# Patient Record
Sex: MALE | Race: WHITE
[De-identification: names, ages, dates, MRNs, and addresses within clinical notes are randomized per-mention and may not be internally consistent; named-entity substitution may affect disease eponyms.]

---

## 2017-11-01 ENCOUNTER — HOSPITAL ENCOUNTER (OUTPATIENT)
Dept: HOSPITAL 17 - HROP | Age: 82
Discharge: HOME | End: 2017-11-01
Attending: INTERNAL MEDICINE
Payer: MEDICARE

## 2017-11-01 VITALS
OXYGEN SATURATION: 97 % | SYSTOLIC BLOOD PRESSURE: 120 MMHG | DIASTOLIC BLOOD PRESSURE: 57 MMHG | HEART RATE: 69 BPM | RESPIRATION RATE: 20 BRPM

## 2017-11-01 VITALS
HEART RATE: 74 BPM | SYSTOLIC BLOOD PRESSURE: 129 MMHG | TEMPERATURE: 97.5 F | OXYGEN SATURATION: 90 % | DIASTOLIC BLOOD PRESSURE: 68 MMHG | RESPIRATION RATE: 20 BRPM

## 2017-11-01 VITALS — BODY MASS INDEX: 21.05 KG/M2 | WEIGHT: 150.36 LBS | HEIGHT: 71 IN

## 2017-11-01 VITALS
TEMPERATURE: 97.4 F | HEART RATE: 64 BPM | SYSTOLIC BLOOD PRESSURE: 125 MMHG | DIASTOLIC BLOOD PRESSURE: 67 MMHG | OXYGEN SATURATION: 98 % | RESPIRATION RATE: 20 BRPM

## 2017-11-01 VITALS
SYSTOLIC BLOOD PRESSURE: 120 MMHG | RESPIRATION RATE: 20 BRPM | HEART RATE: 65 BPM | DIASTOLIC BLOOD PRESSURE: 63 MMHG | OXYGEN SATURATION: 98 %

## 2017-11-01 DIAGNOSIS — F10.10: ICD-10-CM

## 2017-11-01 DIAGNOSIS — Z85.46: ICD-10-CM

## 2017-11-01 DIAGNOSIS — I10: ICD-10-CM

## 2017-11-01 DIAGNOSIS — R07.9: ICD-10-CM

## 2017-11-01 DIAGNOSIS — K21.9: ICD-10-CM

## 2017-11-01 DIAGNOSIS — Z01.818: ICD-10-CM

## 2017-11-01 DIAGNOSIS — Z45.2: Primary | ICD-10-CM

## 2017-11-01 DIAGNOSIS — E78.00: ICD-10-CM

## 2017-11-01 DIAGNOSIS — C15.4: ICD-10-CM

## 2017-11-01 DIAGNOSIS — F17.200: ICD-10-CM

## 2017-11-01 LAB
INR PPP: 1 RATIO
PROTHROMBIN TIME: 11.4 SEC (ref 9.8–11.6)

## 2017-11-01 PROCEDURE — 85610 PROTHROMBIN TIME: CPT

## 2017-11-01 PROCEDURE — 36590 REMOVAL TUNNELED CV CATH: CPT

## 2017-11-01 PROCEDURE — 85730 THROMBOPLASTIN TIME PARTIAL: CPT

## 2017-11-01 NOTE — PD.RAD
Post Procedure Progress Note


Pre Procedure Diagnosis:  


(1) Esophageal adenocarcinoma


Post Procedure Diagnosis:  


(1) Esophageal adenocarcinoma


Procedure Date:


Nov 1, 2017


Supervising Radiologist:


Franklin Hunter


Proceduralist/Assist:  Loree Romero, RT(R)(CV), RT More(R)


Anesthesia:  Local


Plan of Activity


Patient to Unit:  ROP[facility]


Patient Condition:  Good


See PACS Report for procedural detail/treatment





Central Venous Access Device


Procedure 1


Right


Internal Jugular


Infusaport


Removal


single lumen











rFanklin Hunter MD Nov 1, 2017 10:51

## 2017-11-01 NOTE — PD.RAD
Post Procedure Progress Note


Pre Procedure Diagnosis:  


(1) Esophageal adenocarcinoma


Post Procedure Diagnosis:  


(1) Esophageal adenocarcinoma


Procedure Date:


Nov 1, 2017


Supervising Radiologist:


Franklin Hunter


Proceduralist/Assist:  Loree Romero, RT(R)(CV), RT More(R)


Anesthesia:  Local


Plan of Activity


Patient to Unit:  ROP[facility]


Patient Condition:  Good


See PACS Report for procedural detail/treatment





Central Venous Access Device


Procedure 1


Right


Internal Jugular


Infusaport


Removal


single lumen











Franklin Hunter MD Nov 1, 2017 10:51

## 2018-04-11 ENCOUNTER — HOSPITAL ENCOUNTER (INPATIENT)
Dept: HOSPITAL 17 - NEPC | Age: 83
LOS: 11 days | Discharge: HOME HEALTH | DRG: 356 | End: 2018-04-22
Attending: FAMILY MEDICINE | Admitting: FAMILY MEDICINE
Payer: MEDICARE

## 2018-04-11 VITALS
DIASTOLIC BLOOD PRESSURE: 65 MMHG | SYSTOLIC BLOOD PRESSURE: 144 MMHG | HEART RATE: 57 BPM | OXYGEN SATURATION: 96 % | RESPIRATION RATE: 19 BRPM

## 2018-04-11 VITALS
SYSTOLIC BLOOD PRESSURE: 145 MMHG | HEART RATE: 54 BPM | RESPIRATION RATE: 20 BRPM | DIASTOLIC BLOOD PRESSURE: 65 MMHG | OXYGEN SATURATION: 96 %

## 2018-04-11 VITALS
HEART RATE: 72 BPM | RESPIRATION RATE: 17 BRPM | SYSTOLIC BLOOD PRESSURE: 123 MMHG | TEMPERATURE: 97.2 F | DIASTOLIC BLOOD PRESSURE: 60 MMHG | OXYGEN SATURATION: 100 %

## 2018-04-11 VITALS — RESPIRATION RATE: 16 BRPM | OXYGEN SATURATION: 97 %

## 2018-04-11 VITALS
RESPIRATION RATE: 17 BRPM | DIASTOLIC BLOOD PRESSURE: 68 MMHG | OXYGEN SATURATION: 98 % | HEART RATE: 57 BPM | SYSTOLIC BLOOD PRESSURE: 155 MMHG | TEMPERATURE: 97.6 F

## 2018-04-11 VITALS — HEIGHT: 71 IN | BODY MASS INDEX: 18.18 KG/M2 | WEIGHT: 129.85 LBS

## 2018-04-11 VITALS
HEART RATE: 60 BPM | OXYGEN SATURATION: 97 % | RESPIRATION RATE: 19 BRPM | DIASTOLIC BLOOD PRESSURE: 60 MMHG | SYSTOLIC BLOOD PRESSURE: 124 MMHG

## 2018-04-11 VITALS
DIASTOLIC BLOOD PRESSURE: 69 MMHG | HEART RATE: 53 BPM | RESPIRATION RATE: 16 BRPM | TEMPERATURE: 97.3 F | SYSTOLIC BLOOD PRESSURE: 145 MMHG | OXYGEN SATURATION: 99 %

## 2018-04-11 DIAGNOSIS — Z88.0: ICD-10-CM

## 2018-04-11 DIAGNOSIS — Z85.01: ICD-10-CM

## 2018-04-11 DIAGNOSIS — Z92.3: ICD-10-CM

## 2018-04-11 DIAGNOSIS — K22.6: ICD-10-CM

## 2018-04-11 DIAGNOSIS — Z85.46: ICD-10-CM

## 2018-04-11 DIAGNOSIS — F17.210: ICD-10-CM

## 2018-04-11 DIAGNOSIS — M19.90: ICD-10-CM

## 2018-04-11 DIAGNOSIS — E46: ICD-10-CM

## 2018-04-11 DIAGNOSIS — F10.20: ICD-10-CM

## 2018-04-11 DIAGNOSIS — E78.5: ICD-10-CM

## 2018-04-11 DIAGNOSIS — I10: ICD-10-CM

## 2018-04-11 DIAGNOSIS — Z87.19: ICD-10-CM

## 2018-04-11 DIAGNOSIS — Z92.21: ICD-10-CM

## 2018-04-11 DIAGNOSIS — R18.8: ICD-10-CM

## 2018-04-11 DIAGNOSIS — K21.9: ICD-10-CM

## 2018-04-11 DIAGNOSIS — K22.2: ICD-10-CM

## 2018-04-11 DIAGNOSIS — C78.89: Primary | ICD-10-CM

## 2018-04-11 DIAGNOSIS — K74.60: ICD-10-CM

## 2018-04-11 LAB
ALBUMIN SERPL-MCNC: 3.5 GM/DL (ref 3.4–5)
ALP SERPL-CCNC: 177 U/L (ref 45–117)
ALT SERPL-CCNC: 31 U/L (ref 12–78)
AST SERPL-CCNC: 33 U/L (ref 15–37)
BASOPHILS # BLD AUTO: 0 TH/MM3 (ref 0–0.2)
BASOPHILS NFR BLD: 0.3 % (ref 0–2)
BILIRUB SERPL-MCNC: 0.5 MG/DL (ref 0.2–1)
BUN SERPL-MCNC: 20 MG/DL (ref 7–18)
CALCIUM SERPL-MCNC: 9.1 MG/DL (ref 8.5–10.1)
CHLORIDE SERPL-SCNC: 100 MEQ/L (ref 98–107)
CREAT SERPL-MCNC: 1.08 MG/DL (ref 0.6–1.3)
EOSINOPHIL # BLD: 0.1 TH/MM3 (ref 0–0.4)
EOSINOPHIL NFR BLD: 1.4 % (ref 0–4)
ERYTHROCYTE [DISTWIDTH] IN BLOOD BY AUTOMATED COUNT: 13.2 % (ref 11.6–17.2)
GFR SERPLBLD BASED ON 1.73 SQ M-ARVRAT: 65 ML/MIN (ref 89–?)
GLUCOSE SERPL-MCNC: 142 MG/DL (ref 74–106)
HCO3 BLD-SCNC: 28.4 MEQ/L (ref 21–32)
HCT VFR BLD CALC: 39.9 % (ref 39–51)
HGB BLD-MCNC: 13.6 GM/DL (ref 13–17)
INR PPP: 1.1 RATIO
LYMPHOCYTES # BLD AUTO: 1.2 TH/MM3 (ref 1–4.8)
LYMPHOCYTES NFR BLD AUTO: 14.9 % (ref 9–44)
MCH RBC QN AUTO: 33 PG (ref 27–34)
MCHC RBC AUTO-ENTMCNC: 34 % (ref 32–36)
MCV RBC AUTO: 97.2 FL (ref 80–100)
MONOCYTE #: 0.5 TH/MM3 (ref 0–0.9)
MONOCYTES NFR BLD: 6.7 % (ref 0–8)
NEUTROPHILS # BLD AUTO: 6 TH/MM3 (ref 1.8–7.7)
NEUTROPHILS NFR BLD AUTO: 76.7 % (ref 16–70)
PLATELET # BLD: 199 TH/MM3 (ref 150–450)
PMV BLD AUTO: 7.8 FL (ref 7–11)
PROT SERPL-MCNC: 7.4 GM/DL (ref 6.4–8.2)
PROTHROMBIN TIME: 11.6 SEC (ref 9.8–11.6)
RBC # BLD AUTO: 4.11 MIL/MM3 (ref 4.5–5.9)
SODIUM SERPL-SCNC: 135 MEQ/L (ref 136–145)
WBC # BLD AUTO: 7.9 TH/MM3 (ref 4–11)

## 2018-04-11 PROCEDURE — 82378 CARCINOEMBRYONIC ANTIGEN: CPT

## 2018-04-11 PROCEDURE — 88342 IMHCHEM/IMCYTCHM 1ST ANTB: CPT

## 2018-04-11 PROCEDURE — 80048 BASIC METABOLIC PNL TOTAL CA: CPT

## 2018-04-11 PROCEDURE — 74018 RADEX ABDOMEN 1 VIEW: CPT

## 2018-04-11 PROCEDURE — 88112 CYTOPATH CELL ENHANCE TECH: CPT

## 2018-04-11 PROCEDURE — 93005 ELECTROCARDIOGRAM TRACING: CPT

## 2018-04-11 PROCEDURE — 71045 X-RAY EXAM CHEST 1 VIEW: CPT

## 2018-04-11 PROCEDURE — 85730 THROMBOPLASTIN TIME PARTIAL: CPT

## 2018-04-11 PROCEDURE — 88341 IMHCHEM/IMCYTCHM EA ADD ANTB: CPT

## 2018-04-11 PROCEDURE — 85025 COMPLETE CBC W/AUTO DIFF WBC: CPT

## 2018-04-11 PROCEDURE — 88305 TISSUE EXAM BY PATHOLOGIST: CPT

## 2018-04-11 PROCEDURE — 88307 TISSUE EXAM BY PATHOLOGIST: CPT

## 2018-04-11 PROCEDURE — 85610 PROTHROMBIN TIME: CPT

## 2018-04-11 PROCEDURE — 80053 COMPREHEN METABOLIC PANEL: CPT

## 2018-04-11 RX ADMIN — HEPARIN SODIUM SCH UNITS: 10000 INJECTION, SOLUTION INTRAVENOUS; SUBCUTANEOUS at 15:16

## 2018-04-11 RX ADMIN — STANDARDIZED SENNA CONCENTRATE AND DOCUSATE SODIUM SCH TAB: 8.6; 5 TABLET, FILM COATED ORAL at 20:55

## 2018-04-11 RX ADMIN — Medication SCH ML: at 20:56

## 2018-04-11 RX ADMIN — THIAMINE HYDROCHLORIDE SCH MLS/HR: 100 INJECTION, SOLUTION INTRAMUSCULAR; INTRAVENOUS at 15:16

## 2018-04-11 NOTE — PD.CONS
HPI


History of Present Illness


This is a 82 year old male with hx esophageal ca diagnosed 2016 s/p chemo and 

radiation who was referred by Dr Botello for endoscopic w/u after having recent 

PET scan suspicious for recurrent malignancy GE junction.  Pt had normal EGD 

2017.  Pt is c/o of dysphagia and weight loss.  For the last 2-3 weeks he has 

had difficulty swallowing solids and some liquids.  He regurgitates most solid 

food that he eats.  he is able to tolerate liquids and ensure but it takes him 

a long time to drink small amounts.  He has lost 20lbs in the last 3-4 weeks.  

He is scheduled for EUS with Dr Johnson 4/27/18.


 (Sonia Remy)





PFSH


Past Medical History


prostate ca


esophageal ca


Past Surgical History


ORIF ankle


prostate seeds


 (Sonia Remy)


Coded Allergies:  


     penicillin G (Unverified  Allergy, Mild, Rash, 4/11/18)


Family History


unk


Social History


drinks 1 beer daily but none in last couple weeks


smokes 1ppd


denies illicit drug use


 (Sonia Remy)





Review of Systems


Constitutional:  COMPLAINS OF: Weight loss


Endocrine:  DENIES: Polydipsia


Eyes:  DENIES: Blurred vision


Ears, nose, mouth, throat:  DENIES: Hearing loss


Respiratory:  DENIES: Cough


Cardiovascular:  DENIES: Chest pain


Gastrointestinal:  COMPLAINS OF: Vomiting, Difficulty Swallowing, DENIES: 

Abdominal pain, Black stools, Bloody stools, Nausea, Odynophagia, Hematemesis


Genitourinary:  DENIES: Hematuria


Musculoskeletal:  DENIES: Joint Swelling


Integumentary:  DENIES: Rash


Hematologic/lymphatic:  DENIES: Bruising


Immunologic/allergic:  DENIES: Eczema


Neurologic:  DENIES: Abnormal gait


Psychiatric:  DENIES: Confusion (Sonia Remy)





GI Exam


Vitals I&O





Vital Signs








  Date Time  Temp Pulse Resp B/P (MAP) Pulse Ox O2 Delivery O2 Flow Rate FiO2


 


4/11/18 15:00  54 20 145/65 (91) 96 Room Air  


 


4/11/18 13:00  57 19 144/65 (91) 96 Room Air  


 


4/11/18 11:00  60 19 124/60 (81) 97 Room Air  


 


4/11/18 08:33     97 Room Air  


 


4/11/18 08:33   16  99 Room Air  


 


4/11/18 08:07 97.2 72 17 123/60 (81) 100   








Imaging





Last Impressions








Chest X-Ray 4/11/18 0812 Signed





Impressions: 





 Service Date/Time:  Wednesday, April 11, 2018 08:35 - CONCLUSION:  New minimal 





 parenchymal changes right apex New from comparison study.     Diego Prince MD  FACR








Laboratory











Test


  4/11/18


08:35


 


White Blood Count 7.9 TH/MM3 


 


Red Blood Count 4.11 MIL/MM3 


 


Hemoglobin 13.6 GM/DL 


 


Hematocrit 39.9 % 


 


Mean Corpuscular Volume 97.2 FL 


 


Mean Corpuscular Hemoglobin 33.0 PG 


 


Mean Corpuscular Hemoglobin


Concent 34.0 % 


 


 


Red Cell Distribution Width 13.2 % 


 


Platelet Count 199 TH/MM3 


 


Mean Platelet Volume 7.8 FL 


 


Neutrophils (%) (Auto) 76.7 % 


 


Lymphocytes (%) (Auto) 14.9 % 


 


Monocytes (%) (Auto) 6.7 % 


 


Eosinophils (%) (Auto) 1.4 % 


 


Basophils (%) (Auto) 0.3 % 


 


Neutrophils # (Auto) 6.0 TH/MM3 


 


Lymphocytes # (Auto) 1.2 TH/MM3 


 


Monocytes # (Auto) 0.5 TH/MM3 


 


Eosinophils # (Auto) 0.1 TH/MM3 


 


Basophils # (Auto) 0.0 TH/MM3 


 


CBC Comment DIFF FINAL 


 


Differential Comment  


 


Prothrombin Time 11.6 SEC 


 


Prothromb Time International


Ratio 1.1 RATIO 


 


 


Activated Partial


Thromboplast Time 27.0 SEC 


 


 


Blood Urea Nitrogen 20 MG/DL 


 


Creatinine 1.08 MG/DL 


 


Random Glucose 142 MG/DL 


 


Total Protein 7.4 GM/DL 


 


Albumin 3.5 GM/DL 


 


Calcium Level 9.1 MG/DL 


 


Alkaline Phosphatase 177 U/L 


 


Aspartate Amino Transf


(AST/SGOT) 33 U/L 


 


 


Alanine Aminotransferase


(ALT/SGPT) 31 U/L 


 


 


Total Bilirubin 0.5 MG/DL 


 


Sodium Level 135 MEQ/L 


 


Potassium Level 4.1 MEQ/L 


 


Chloride Level 100 MEQ/L 


 


Carbon Dioxide Level 28.4 MEQ/L 


 


Anion Gap 7 MEQ/L 


 


Estimat Glomerular Filtration


Rate 65 ML/MIN 


 








Physical Examination


HEENT: PERRL; normocephalic; atraumatic; no jaundice.  


CHEST:  CTA, diminished


CARDIAC:  RRR


ABDOMEN:  Soft, nondistended, nontender; no hepatosplenomegaly; bowel sounds 

are present in all four quadrants.


EXTREMITIES: No clubbing, cyanosis, or edema.


SKIN:  Normal; no rash; no jaundice.


CNS:  No focal deficits; alert and oriented times three.


 (Sonia Remy)





Assessment and Plan


Plan


ASSESSMENT


- dysphagia, weight loss - poss malignancy.  2-3 weeks difficulty swallowing, 

regurgitates solids and must drink liquids slowly.  


    reportedly normal EGD 2017. recent PET scan suspicious for recurrent 

malignancy GE jxn.  CT 2/2018 showing


   enlargement previously seen mass GE jxn.  scheduled for EUS 4/27/18.  lost 

20lbs in last 3-4 wks





PLAN


- EGD in am


- obtain consent


- full liquid diet


- NPO after midnight


- further recs to follow


pt seen by myself and Dr Alex and this note is on his behalf


 (Sonia Remy)


Plan


patient was seen and examined, agree with above note, possible esophageal 

cancer recurrence, plan EGD in am, also may need EUS for submucosal evaluation


 (Nessa Alex MD)











Sonia Remy Apr 11, 2018 16:04


Nessa Alex MD Apr 11, 2018 16:13

## 2018-04-11 NOTE — PD
HPI


Chief Complaint:  Medical Clearance


Time Seen by Provider:  08:15


Travel History


International Travel<30 days:  No


Contact w/Intl Traveler<30days:  No


Traveled to known affect area:  No





History of Present Illness


HPI


82-year-old male complains of trouble swallowing food.  Patient has history of 

esophageal cancer.  Patient had endoscopy done in 2016 with showed moderately 

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  He had no 

evidence of metastatic disease.  Patient was treated with chemotherapy and 

radiation therapy which completed in 2016.  Patient had PET scan done on Aisha 15

, 2017 which shows suspicious for recurrent disease at the gastric junction.  

Patient had endoscopy done by Dr. Carroll in 2017 was reported to be normal.  

Patient however complains of trouble swallowing food for the past 2 weeks.  

Patient states that he has to drink water and sure slowly to get that through.  

Patient states that he has occasional trouble with eating food.  Patient states 

that he has vomiting with eating solid food.  Patient denies any headache.  

Patient denies any chest pain or shortness of breath.  Patient denies abdominal 

pain.  Patient denies any fever chills.  Patient was seen by Dr. Wheeelr, 

oncologist yesterday and was advised to be admitted for endoscopy workup.  

Patient has history GERD, hyperlipidemia, hypertension, prostate cancer status 

post radioactive treatment.





PFSH


Past Medical History


Arthritis:  Yes (HANDS)


Asthma:  No


Autoimmune Disease:  No


Heart Rhythm Problems:  No


Cancer:  Yes (THROAT/PROSTATE)


Cardiovascular Problems:  No


High Cholesterol:  Yes


Chemotherapy:  Yes


Chest Pain:  Yes


Congestive Heart Failure:  No


COPD:  No


Cerebrovascular Accident:  No


Diabetes:  No


Diminished Hearing:  No


Gastrointestinal Disorders:  Yes (Esophageal Cancer 2016)


GERD:  Yes


Glaucoma:  No


Genitourinary:  No


Headaches:  No


Hepatitis:  No


Hiatal Hernia:  No


Hypertension:  Yes


Immune Disorder:  Yes


Kidney Stones:  No


Musculoskeletal:  No


Neurologic:  No


Psychiatric:  No


Reproductive:  Yes


Respiratory:  No


Immunizations Current:  No


Myocardial Infarction:  No


Radiation Therapy:  Yes (seeds)


Renal Failure:  No


Seizures:  No


Sleep Apnea:  No


Thyroid Disease:  No


Ulcer:  No


Tetanus Vaccination:  > 5 Years


Influenza Vaccination:  No





Past Surgical History


Abdominal Surgery:  No


AICD:  No


Cardiac Surgery:  No


Ear Surgery:  No


Endocrine Surgery:  No


Eye Surgery:  No


Genitourinary Surgery:  No


Gynecologic Surgery:  No


Neurologic Surgery:  No


Oral Surgery:  No


Pacemaker:  No


Thoracic Surgery:  No


Other Surgery:  Yes (HERNIA REPAIR APPROXIMATELY 20 YEARS AGO)





Social History


Alcohol Use:  No (2 BEERS PER DAY)


Tobacco Use:  Yes (1PPD)


Substance Use:  No





Allergies-Medications


(Allergen,Severity, Reaction):  


Coded Allergies:  


     penicillin G (Unverified  Allergy, Mild, Rash, 4/11/18)


Reported Meds & Prescriptions





Reported Meds & Active Scripts


Active


No Active Prescriptions or Reported Medications    








Review of Systems


General / Constitutional:  No: Fever


Eyes:  No: Visual changes


HENT:  No: Headaches


Cardiovascular:  No: Chest Pain or Discomfort


Respiratory:  No: Shortness of Breath


Gastrointestinal:  Positive: Loss of Appetite, No: Abdominal Pain


Genitourinary:  No: Dysuria


Musculoskeletal:  No: Pain


Skin:  No Rash


Neurologic:  No: Weakness


Psychiatric:  No: Depression


Endocrine:  No: Polydipsia


Hematologic/Lymphatic:  No: Easy Bruising





Physical Exam


Narrative


GENERAL: Well-nourished, well-developed patient.


SKIN: Focused skin assessment warm/dry.


HEAD: Normocephalic.


EYES: No scleral icterus. No injection or drainage. 


NECK: Supple, trachea midline. No JVD or lymphadenopathy.


CARDIOVASCULAR: Regular rate and rhythm without murmurs, gallops, or rubs. 


RESPIRATORY: Breath sounds equal bilaterally. No accessory muscle use.


GASTROINTESTINAL: Abdomen soft, non-tender, nondistended. 


MUSCULOSKELETAL: No cyanosis, or edema. 


BACK: Nontender without obvious deformity. No CVA tenderness.


Neurologic exam normal.





Data


Data


Last Documented VS





Vital Signs








  Date Time  Temp Pulse Resp B/P (MAP) Pulse Ox O2 Delivery O2 Flow Rate FiO2


 


4/11/18 08:33     97 Room Air  


 


4/11/18 08:33   16     


 


4/11/18 08:07 97.2 72  123/60 (81)    








Orders





 Orders


Electrocardiogram (4/11/18 08:25)


Complete Blood Count With Diff (4/11/18 08:25)


Comprehensive Metabolic Panel (4/11/18 08:25)


Prothrombin Time / Inr (Pt) (4/11/18 08:25)


Act Partial Throm Time (Ptt) (4/11/18 08:25)


Chest, Single Ap (4/11/18 08:25)


Iv Access Insert/Monitor (4/11/18 08:25)


Ecg Monitoring (4/11/18 08:25)


Oximetry (4/11/18 08:25)





Labs





Laboratory Tests








Test


  4/11/18


08:35


 


White Blood Count 7.9 TH/MM3 


 


Red Blood Count 4.11 MIL/MM3 


 


Hemoglobin 13.6 GM/DL 


 


Hematocrit 39.9 % 


 


Mean Corpuscular Volume 97.2 FL 


 


Mean Corpuscular Hemoglobin 33.0 PG 


 


Mean Corpuscular Hemoglobin


Concent 34.0 % 


 


 


Red Cell Distribution Width 13.2 % 


 


Platelet Count 199 TH/MM3 


 


Mean Platelet Volume 7.8 FL 


 


Neutrophils (%) (Auto) 76.7 % 


 


Lymphocytes (%) (Auto) 14.9 % 


 


Monocytes (%) (Auto) 6.7 % 


 


Eosinophils (%) (Auto) 1.4 % 


 


Basophils (%) (Auto) 0.3 % 


 


Neutrophils # (Auto) 6.0 TH/MM3 


 


Lymphocytes # (Auto) 1.2 TH/MM3 


 


Monocytes # (Auto) 0.5 TH/MM3 


 


Eosinophils # (Auto) 0.1 TH/MM3 


 


Basophils # (Auto) 0.0 TH/MM3 


 


CBC Comment DIFF FINAL 


 


Differential Comment  


 


Prothrombin Time 11.6 SEC 


 


Prothromb Time International


Ratio 1.1 RATIO 


 


 


Activated Partial


Thromboplast Time 27.0 SEC 


 


 


Blood Urea Nitrogen 20 MG/DL 


 


Creatinine 1.08 MG/DL 


 


Random Glucose 142 MG/DL 


 


Total Protein 7.4 GM/DL 


 


Albumin 3.5 GM/DL 


 


Calcium Level 9.1 MG/DL 


 


Alkaline Phosphatase 177 U/L 


 


Aspartate Amino Transf


(AST/SGOT) 33 U/L 


 


 


Alanine Aminotransferase


(ALT/SGPT) 31 U/L 


 


 


Total Bilirubin 0.5 MG/DL 


 


Sodium Level 135 MEQ/L 


 


Potassium Level 4.1 MEQ/L 


 


Chloride Level 100 MEQ/L 


 


Carbon Dioxide Level 28.4 MEQ/L 


 


Anion Gap 7 MEQ/L 


 


Estimat Glomerular Filtration


Rate 65 ML/MIN 


 











[facility]


Medical Decision Making


Medical Screen Exam Complete:  Yes


Emergency Medical Condition:  Yes


Interpretation(s)





Last Impressions








Chest X-Ray 4/11/18 0825 Signed





Impressions: 





 Service Date/Time:  Wednesday, April 11, 2018 08:35 - CONCLUSION:  New minimal 





 parenchymal changes right apex New from comparison study.     Diego Prince MD  FACR





1348 PM.  CBC within normal limits.  Sodium 135.  BUN 20.  GFR 65.  Glucose 142.


Differential Diagnosis


Differential diagnosis including recurrence of esophageal cancer, dysphagia, 

motility problem.


Narrative Course


82-year-old male with problem with swallowing.  History of esophageal cancer.  

Recent PET scan showed possible recurrence in the gastric junction.  Normal 

saline solution 1 25 cc an hour.





Diagnosis





 Primary Impression:  


 Esophageal obstruction


 Additional Impression:  


 History of esophageal cancer





Admitting Information


Admitting Physician Requests:  Admit


Scripts


No Active Prescriptions or Reported Meds











Jaquan Go MD Apr 11, 2018 08:40

## 2018-04-11 NOTE — MB
cc:

Isacc Wheeler MD

****

 

 

DATE:

04/11/2018

 

REASON FOR CONSULTATION:

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 

proximal stomach.

 

PATIENT PROFILE:

The patient is an 82-year-old male.  He is .  He was born in 

[place], and has lived in [place] since 1956.  He has 1 son.  He 

resides in [place].  He is retired.  He was in the  for 

20 years and following this had worked for the [employer] as a  and then as an .  He has smoked 1-1/2 

packs of cigarettes per day for at least 50 years, although at the 

present tobacco use is rare.  He is alcoholic.  In the past he would drink 8

beers per day.  Currently, he still drinks, but a limited amount 

because he is unable to swallow.

 

HISTORY OF PRESENT ILLNESS:

The patient is an 82-year-old male who developed progressive 

dysphagia.  On 05/23/2016, he had a CAT scan of the thorax and was 

found to have a 6 cm mass involving the mid esophagus.

 

On 05/23/2016 he had upper endoscopy and biopsy of the mid esophagus 

and was found to have a moderately differentiated squamous cell 

carcinoma.  He had no evidence of metastatic disease and his clinical 

stage was T3 N0 M0.  He was treated with a combination of radiation 

therapy and weekly carboplatin and Taxol using the CROSS regimen.  He 

did well and had a complete response.  He underwent sequential PET

scans and upper endoscopies and had no evidence of active disease.

 

He had a followup PET CT scan  on 2/20/2018.  He had an 

appointment to see me following this.  The PET scan was positive for 

recurrent tumor involving the distal esophagus or proximal stomach.  

He arrived at the office and after waiting for between 30 and 45 

minutes, he left and decided that he would rather go to a bar.  He 

went to the bar, drank, returned home.  We contacted him and he did 

not return until I saw him yesterday.

 

On 03/09/2018 he saw Dr. Botello who felt that he had recurrence and 

referred him for upper endoscopy.  I saw the patient yesterday in 

the office and it was apparent that he needed to move forward with 

evaluation and treatment immediately.  His upper endoscopy was 

scheduled for approximately 2 or 3 weeks from now.

 

Mr. Durrwachter is unable to swallow or consume any significant amount of liquid

or food.  He has severe dysphagia.  He has lost 20 pounds.  He has muscle loss.
  He is

slowly dying.  Arrangements were made for admission to the hospital, 

which has occurred this morning.

 

His PET scan did not show evidence of metastatic disease.  It was not 

possible to tell whether there was disease outside the esophagus or 

adjacent lymph nodes.

 

PAST SURGICAL HISTORY:

1.  Left ankle surgery.

2.  Left inguinal hernia repair.

3.  2005, prostate cancer diagnosed, treated with palladium seeds.

4.  Colonoscopy in 2016.

5.  05/23/2016 upper endoscopy and biopsy of mid esophagus revealing 

invasive squamous cell cancer.

 

PAST MEDICAL HISTORY:

1.  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 

radiation from 06/07/2016-07/15/2016 with concomitant weekly 

carboplatin and Taxol.

2.  Alcoholism.

3.  Tobacco use.

4.  Arthritis.

5.  Transient hypertension.

6.  Prostate cancer in remission.

 

ALLERGIES:

QUESTIONABLE ALLERGY TO PENICILLIN.

 

MEDICATIONS PRIOR TO ADMISSION:

No active medications.

 

FAMILY HISTORY:

Noncontributory.

 

REVIEW OF SYSTEMS:

No change in vision or hearing.  No chest pain or palpitations.  No 

shortness of breath.  Rare cough.  No abdominal pain.  Approximately 

15-20 pound weight loss in the past several weeks, severe dysphagia.  

It takes him about 1/2 an hour to sip a drink.  No melena, 

hematochezia, hematemesis.  Occasionally, he will vomit phlegm.  No 

problems with urination.  No bone pain.  No focal weakness.  No 

psychiatric issues.

 

LABORATORY DATA:

Hemoglobin 13, white count 7900, platelets 199,000.  Electrolytes, BUN

and creatinine unremarkable.

 

PHYSICAL EXAM:

patient is gaunt and with significant wt loss

/65, R 18, Pulse 70, afebrile

HEENT: normal 

NO adenopathy 

neck supple without  masses

heart reg rhythm without gallops or murmurs

Lungs: clear 

Breast: no masses

Abd: soft, liver 1 cm below right costal margin

Extrem: no edema

MSK: muscle wasting

SKin: normal 

Neurologic: normal

Psychiatric: normal 



ASSESSMENT:

The patient is an 82-year-old male who was diagnosed with a clinical 

T3 N0 M0 squamous cell cancer of the esophagus in 05/2016.  He was 

treated with radiation with carboplatin and Taxol.  On 2/20/2018 

he had a PET scan highly suggestive of recurrent disease at the GE junction.  

He did not pursue evaluation and only recently with progressive weight

loss and dysphagia is he actively pursuing evaluation.

 

The PET scan did not show metastatic disease.

 

RECOMMENDATIONS:

1.  Upper endoscopy.

2.  Surgical consult.  I have placed a request for Dr. Bagley.  

GI has been consulted.  I have spoken with Dr. Bagley

3: I spoke with Dr. Alejo recently and he is not likely to benefit from 
further radiation

4: systemic chemotherapy will be difficult to tolerate and the impact limited. 
Hopefully he 

is a candidate for surgical resection of local recurrence. 

 

 

__________________________________

Isacc Wheeler MD

 

 

RW/rt

D: 04/11/2018, 09:08 PM

T: 04/11/2018, 10:05 PM

Visit #: [id number]

Job #: [id number]

MTDD

## 2018-04-11 NOTE — RADRPT
EXAM DATE/TIME:  04/11/2018 08:35 

 

[facility] COMPARISON:     

CHEST SINGLE AP, May 22, 2016, 9:25.

 

                     

INDICATIONS :     

Distal esophageal pain when eating. Patient states recurrence of esophageal carcinoma.

                     

 

MEDICAL HISTORY :     

Carcinoma, prostatic.  Carcinoma, esophageal.  Gastroesophageal reflux disease.      

 

SURGICAL HISTORY :        

Hernia repair.

 

ENCOUNTER:     

Initial                                        

 

ACUITY:     

3 days      

 

PAIN SCORE:     

4/10

 

LOCATION:      

distal esophagus

 

FINDINGS:     

Minimal parenchymal changes in the right apex new from comparison study.  No other suspicious lung no
dules..  The cardiomediastinal contours are unremarkable.  Osseous structures are intact.

 

CONCLUSION:     

New minimal parenchymal changes right apex

New from comparison study.

 

 

 

 Diego Prince MD FACR on April 11, 2018 at 8:44           

Board Certified Radiologist.

 This report was verified electronically.

## 2018-04-11 NOTE — EKG
Date Performed: 04/11/2018       Time Performed: 08:29:11

 

PTAGE:      82 years

 

EKG:      SINUS BRADYCARDIA WITH FIRST DEGREE AV BLOCK ABNORMAL ECG INTERPRETATION BASED ON A DEFAULT
 AGE OF 40 YEARS 

 

NO PREVIOUS TRACING            

 

DOCTOR:   Nick Sagastume  Interpretating Date/Time  04/11/2018 21:00:09

## 2018-04-12 VITALS
HEART RATE: 81 BPM | TEMPERATURE: 98.6 F | RESPIRATION RATE: 20 BRPM | DIASTOLIC BLOOD PRESSURE: 60 MMHG | SYSTOLIC BLOOD PRESSURE: 128 MMHG | OXYGEN SATURATION: 100 %

## 2018-04-12 VITALS
OXYGEN SATURATION: 98 % | SYSTOLIC BLOOD PRESSURE: 129 MMHG | DIASTOLIC BLOOD PRESSURE: 61 MMHG | HEART RATE: 64 BPM | TEMPERATURE: 98 F | RESPIRATION RATE: 18 BRPM

## 2018-04-12 VITALS
TEMPERATURE: 96.3 F | RESPIRATION RATE: 16 BRPM | SYSTOLIC BLOOD PRESSURE: 107 MMHG | DIASTOLIC BLOOD PRESSURE: 57 MMHG | OXYGEN SATURATION: 96 % | HEART RATE: 69 BPM

## 2018-04-12 VITALS
DIASTOLIC BLOOD PRESSURE: 59 MMHG | HEART RATE: 80 BPM | OXYGEN SATURATION: 97 % | TEMPERATURE: 98.1 F | RESPIRATION RATE: 16 BRPM | SYSTOLIC BLOOD PRESSURE: 121 MMHG

## 2018-04-12 VITALS
RESPIRATION RATE: 18 BRPM | SYSTOLIC BLOOD PRESSURE: 99 MMHG | TEMPERATURE: 98.2 F | OXYGEN SATURATION: 98 % | HEART RATE: 74 BPM | DIASTOLIC BLOOD PRESSURE: 54 MMHG

## 2018-04-12 LAB
ALBUMIN SERPL-MCNC: 2.9 GM/DL (ref 3.4–5)
ALP SERPL-CCNC: 156 U/L (ref 45–117)
ALT SERPL-CCNC: 23 U/L (ref 12–78)
AST SERPL-CCNC: 26 U/L (ref 15–37)
BILIRUB SERPL-MCNC: 0.6 MG/DL (ref 0.2–1)
BUN SERPL-MCNC: 16 MG/DL (ref 7–18)
CALCIUM SERPL-MCNC: 9 MG/DL (ref 8.5–10.1)
CHLORIDE SERPL-SCNC: 100 MEQ/L (ref 98–107)
CREAT SERPL-MCNC: 0.76 MG/DL (ref 0.6–1.3)
GFR SERPLBLD BASED ON 1.73 SQ M-ARVRAT: 98 ML/MIN (ref 89–?)
GLUCOSE SERPL-MCNC: 72 MG/DL (ref 74–106)
HCO3 BLD-SCNC: 26.2 MEQ/L (ref 21–32)
PROT SERPL-MCNC: 6.4 GM/DL (ref 6.4–8.2)
SODIUM SERPL-SCNC: 134 MEQ/L (ref 136–145)

## 2018-04-12 PROCEDURE — 0DB48ZX EXCISION OF ESOPHAGOGASTRIC JUNCTION, VIA NATURAL OR ARTIFICIAL OPENING ENDOSCOPIC, DIAGNOSTIC: ICD-10-PCS | Performed by: INTERNAL MEDICINE

## 2018-04-12 RX ADMIN — Medication SCH ML: at 21:00

## 2018-04-12 RX ADMIN — THIAMINE HYDROCHLORIDE SCH MLS/HR: 100 INJECTION, SOLUTION INTRAMUSCULAR; INTRAVENOUS at 17:23

## 2018-04-12 RX ADMIN — THIAMINE HYDROCHLORIDE SCH MLS/HR: 100 INJECTION, SOLUTION INTRAMUSCULAR; INTRAVENOUS at 04:20

## 2018-04-12 RX ADMIN — STANDARDIZED SENNA CONCENTRATE AND DOCUSATE SODIUM SCH TAB: 8.6; 5 TABLET, FILM COATED ORAL at 21:04

## 2018-04-12 RX ADMIN — STANDARDIZED SENNA CONCENTRATE AND DOCUSATE SODIUM SCH TAB: 8.6; 5 TABLET, FILM COATED ORAL at 09:00

## 2018-04-12 RX ADMIN — Medication SCH ML: at 10:35

## 2018-04-12 RX ADMIN — HEPARIN SODIUM SCH UNITS: 10000 INJECTION, SOLUTION INTRAVENOUS; SUBCUTANEOUS at 15:00

## 2018-04-12 RX ADMIN — HEPARIN SODIUM SCH UNITS: 10000 INJECTION, SOLUTION INTRAVENOUS; SUBCUTANEOUS at 03:00

## 2018-04-12 NOTE — HHI.GIFU
Subjective


Remarks


Patient laying in bed, still having nausea and retching, some nausea vomiting 

this morning, no blood or hematemesis





Objective


Vitals I&O





Vital Signs








  Date Time  Temp Pulse Resp B/P (MAP) Pulse Ox O2 Delivery O2 Flow Rate FiO2


 


4/12/18 07:00 96.3 69 16 107/57 (74) 96   


 


4/12/18 03:43 98.6 81 20 128/60 (82) 100   


 


4/12/18 00:05 98.1 80 16 121/59 (79) 97   


 


4/12/18 00:00      Room Air  


 


4/11/18 20:04      Room Air  


 


4/11/18 19:50 97.3 53 16 145/69 (94) 99   


 


4/11/18 17:56 97.6 57 17 155/68 (97) 98   


 


4/11/18 15:00  54 20 145/65 (91) 96 Room Air  














I/O      


 


 4/11/18 4/11/18 4/11/18 4/12/18 4/12/18 4/12/18





 07:00 15:00 23:00 07:00 15:00 23:00


 


Intake Total    1142 ml  


 


Output Total    0 ml  


 


Balance    1142 ml  


 


      


 


Intake Oral    0 ml  


 


IV Total    1142 ml  


 


Output Stool Total    0 ml  


 


# Voids    3  








Laboratory





Laboratory Tests








Test


  4/12/18


07:12


 


Blood Urea Nitrogen 16 


 


Creatinine 0.76 


 


Random Glucose 72 


 


Total Protein 6.4 


 


Albumin 2.9 


 


Calcium Level 9.0 


 


Alkaline Phosphatase 156 


 


Aspartate Amino Transf


(AST/SGOT) 26 


 


 


Alanine Aminotransferase


(ALT/SGPT) 23 


 


 


Total Bilirubin 0.6 


 


Sodium Level 134 


 


Potassium Level 4.1 


 


Chloride Level 100 


 


Carbon Dioxide Level 26.2 


 


Anion Gap 8 


 


Estimat Glomerular Filtration


Rate 98 


 








Physical Exam


HEENT: Pupils round and reactive to light; normocephalic; atraumatic; no 

jaundice.  Throat is clear.


NECK: Neck is supple, no JVD, no lymphadenopathy.


CHEST:  Chest is clear to auscultation and percussion.


CARDIAC:  Regular rate and rhythm with no murmur gallop or rubs.


ABDOMEN:  Soft, nondistended, nontender; no hepatosplenomegaly; bowel sounds 

are present in all four quadrants.


EXTREMITIES: No clubbing, cyanosis, or edema.


SKIN:  Normal; no rash; no jaundice.


CNS:  No focal deficits; alert and oriented times three.





Assessment and Plan


Plan


patient was seen and examined, patient has history of esophageal cancer, 

dysphagia, and endoscopy was done today





IMPRESSION:


Esophagus patient has what looked look like mucosal tear, patient has 

significant nausea vomiting that's this could be Ilsseth-Wheeler tear, she also 

has significant narrowing of the esophagus with thickening of the EG junction 

biopsy was done to rule out recurrent of esophageal cancer, dilation was not 

performed because of the tear in the mucosa and the EG junction


Stomach normal except some thickening at the EG junction on retroflexion biopsy 

was done


Duodenum normal





PLAN:


Await biopsy results


Clear liquid


EUS for evaluation of the esophagus with EGD and dilation











Nessa Alex MD Apr 12, 2018 13:30

## 2018-04-12 NOTE — HHI.HP
History of Present Illness


Primary Care Physician


Chay Lacey, DO


Admission Diagnosis





Esophageal obstruction.  History of esophageal cancer.


Diagnoses:  





Past Family Social History


Allergies:  


Coded Allergies:  


     penicillin G (Unverified  Allergy, Mild, Rash, 18)





Physical Exam


Vital Signs





Vital Signs








  Date Time  Temp Pulse Resp B/P (MAP) Pulse Ox O2 Delivery O2 Flow Rate FiO2


 


18 03:43 98.6 81 20 128/60 (82) 100   


 


18 00:05 98.1 80 16 121/59 (79) 97   


 


18 00:00      Room Air  


 


18 20:04      Room Air  


 


18 19:50 97.3 53 16 145/69 (94) 99   


 


18 17:56 97.6 57 17 155/68 (97) 98   


 


18 15:00  54 20 145/65 (91) 96 Room Air  


 


18 13:00  57 19 144/65 (91) 96 Room Air  


 


18 11:00  60 19 124/60 (81) 97 Room Air  


 


18 08:33     97 Room Air  


 


18 08:33   16  99 Room Air  


 


18 08:07 97.2 72 17 123/60 (81) 100   








Physical Exam


GENERAL: This is a well-nourished, well-developed patient, in no apparent 

distress.


SKIN: No rashes, ecchymoses or lesions. Cool and dry.


HEAD: Atraumatic. Normocephalic. No temporal or scalp tenderness.


EYES: Pupils equal round and reactive. Extraocular motions intact. No scleral 

icterus. No injection or drainage. 


ENT: Nose without bleeding, purulent drainage or septal hematoma. Throat 

without erythema, tonsillar hypertrophy or exudate. Uvula midline. Airway 

patent.


NECK: Trachea midline. No JVD or lymphadenopathy. Supple, nontender, no 

meningeal signs.


CARDIOVASCULAR: Regular rate and rhythm without murmurs, gallops, or rubs. 


RESPIRATORY: Clear to auscultation. Breath sounds equal bilaterally. No wheezes

, rales, or rhonchi.  


GASTROINTESTINAL: Abdomen soft, non-tender, nondistended. No hepato-splenomegaly

, or palpable masses. No guarding.


MUSCULOSKELETAL: Extremities without clubbing, cyanosis, or edema. No joint 

tenderness, effusion, or edema noted. No calf tenderness. Negative Homans sign 

bilaterally.


NEUROLOGICAL: Awake and alert. Cranial nerves II through XII intact.  Motor and 

sensory grossly within normal limits. Five out of 5 muscle strength in all 

muscle groups.  Normal speech.


Laboratory





Laboratory Tests








Test


  18


08:35


 


White Blood Count 7.9 


 


Red Blood Count 4.11 


 


Hemoglobin 13.6 


 


Hematocrit 39.9 


 


Mean Corpuscular Volume 97.2 


 


Mean Corpuscular Hemoglobin 33.0 


 


Mean Corpuscular Hemoglobin


Concent 34.0 


 


 


Red Cell Distribution Width 13.2 


 


Platelet Count 199 


 


Mean Platelet Volume 7.8 


 


Neutrophils (%) (Auto) 76.7 


 


Lymphocytes (%) (Auto) 14.9 


 


Monocytes (%) (Auto) 6.7 


 


Eosinophils (%) (Auto) 1.4 


 


Basophils (%) (Auto) 0.3 


 


Neutrophils # (Auto) 6.0 


 


Lymphocytes # (Auto) 1.2 


 


Monocytes # (Auto) 0.5 


 


Eosinophils # (Auto) 0.1 


 


Basophils # (Auto) 0.0 


 


CBC Comment DIFF FINAL 


 


Differential Comment  


 


Prothrombin Time 11.6 


 


Prothromb Time International


Ratio 1.1 


 


 


Activated Partial


Thromboplast Time 27.0 


 


 


Blood Urea Nitrogen 20 


 


Creatinine 1.08 


 


Random Glucose 142 


 


Total Protein 7.4 


 


Albumin 3.5 


 


Calcium Level 9.1 


 


Alkaline Phosphatase 177 


 


Aspartate Amino Transf


(AST/SGOT) 33 


 


 


Alanine Aminotransferase


(ALT/SGPT) 31 


 


 


Total Bilirubin 0.5 


 


Sodium Level 135 


 


Potassium Level 4.1 


 


Chloride Level 100 


 


Carbon Dioxide Level 28.4 


 


Anion Gap 7 


 


Estimat Glomerular Filtration


Rate 65 


 








Result Diagram:  


1835                                                                   

             1835








Caprini VTE Risk Assessment


Caprini VTE Risk Assessment:  Mod/High Risk (score >= 2)


Caprini Risk Assessment Model











 Point Value = 1          Point Value = 2  Point Value = 3  Point Value = 5


 


Age 41-60


Minor surgery


BMI > 25 kg/m2


Swollen legs


Varicose veins


Pregnancy or postpartum


History of unexplained or recurrent


   spontaneous 


Oral contraceptives or hormone


   replacement


Sepsis (< 1 month)


Serious lung disease, including


   pneumonia (< 1 month)


Abnormal pulmonary function


Acute myocardial infarction


Congestive heart failure (< 1 month)


History of inflammatory bowel disease


Medical patient at bed rest Age 61-74


Arthroscopic surgery


Major open surgery (> 45 min)


Laparoscopic surgery (> 45 min)


Malignancy


Confined to bed (> 72 hours)


Immobilizing plaster cast


Central venous access Age >= 75


History of VTE


Family history of VTE


Factor V Leiden


Prothrombin [id number]


Lupus anticoagulant


Anticardiolipin antibodies


Elevated serum homocysteine


Heparin-induced thrombocytopenia


Other congenital or acquired


   thrombophilia Stroke (< 1 month)


Elective arthroplasty


Hip, pelvis, or leg fracture


Acute spinal cord injury (< 1 month)








Prophylaxis Regimen











   Total Risk


Factor Score Risk Level Prophylaxis Regimen


 


0-1      Low Early ambulation


 


2 Moderate Order ONE of the following:


*Sequential Compression Device (SCD)


*Heparin 5000 units SQ BID


 


3-4 Higher Order ONE of the following medications:


*Heparin 5000 units SQ TID


*Enoxaparin/Lovenox 40 mg SQ daily (WT < 150 kg, CrCl > 30 mL/min)


*Enoxaparin/Lovenox 30 mg SQ daily (WT < 150 kg, CrCl > 10-29 mL/min)


*Enoxaparin/Lovenox 30 mg SQ BID (WT < 150 kg, CrCl > 30 mL/min)


AND/OR


*Sequential Compression Device (SCD)


 


5 or more Highest Order ONE of the following medications:


*Heparin 5000 units SQ TID (Preferred with Epidurals)


*Enoxaparin/Lovenox 40 mg SQ daily (WT < 150 kg, CrCl > 30 mL/min)


*Enoxaparin/Lovenox 30 mg SQ daily (WT < 150 kg, CrCl > 10-29 mL/min)


*Enoxaparin/Lovenox 30 mg SQ BID (WT < 150 kg, CrCl > 30 mL/min)


AND


*Sequential Compression Device (SCD)











Assessment and Plan


Problem List:  


(1) Esophageal obstruction


ICD Codes:  K22.2 - Esophageal obstruction


Status:  Acute


Plan:  NPO for feeding tube placement





(2) History of esophageal cancer


ICD Codes:  Z85.01 - Personal history of malignant neoplasm of esophagus


Status:  Acute


Discharge Planning


Gi/ Oncology consulted follow recommendations.











Vicky Lamb 2018 07:38

## 2018-04-12 NOTE — PD.PROCEDR
GI Procedure


PROCEDURE PERFORMED


Upper endoscopy with biopsy





INDICATION FOR PROCEDURE


Dysphagia, history of esophageal cancer





PROCEDURE:


The procedure, risks and benefits were discussed with Mr. Durrwachter and 

informed


consent was obtained.  Anesthesia sedated him with Diprivan.  He was placed in 

the left lateral decubitus position.





EGD:


The Pentax videoscope was introduced through the oropharynx and advanced to the 

second portion of the duodenum under direct visualization. Retroflexion was 

performed in the stomach.  Biopsy from the EG junction





ESTIMATED BLOOD LOSS:


None





SPECIMENS REMOVED:


EG junction





COMPLICATIONS:


None





IMPRESSION:


Esophagus patient has what looked look like mucosal tear, patient has 

significant nausea vomiting that's this could be Lisseth-Wheeler tear, she also 

has significant narrowing of the esophagus with thickening of the EG junction 

biopsy was done to rule out recurrent of esophageal cancer, dilation was not 

performed because of the tear in the mucosa and the EG junction


Stomach normal except some thickening at the EG junction on retroflexion biopsy 

was done


Duodenum normal





PLAN:


Await biopsy results


Clear liquid


EUS for evaluation of the esophagus with EGD and dilation











Nessa Alex MD Apr 12, 2018 13:29

## 2018-04-12 NOTE — PD.ONC.PN
Subjective


Subjective


Remarks


no complaints although swallowing difficult





Objective


Data











  Date Time  Temp Pulse Resp B/P (MAP) Pulse Ox O2 Delivery O2 Flow Rate FiO2


 


4/12/18 20:00 98.2 74 18 99/54 (69) 98   


 


4/12/18 16:03 98.0 64 18 129/61 (83) 98   


 


4/12/18 16:00      Room Air  


 


4/12/18 13:00 98.0 67 16 117/63 (81) 97   


 


4/12/18 12:00      Room Air  


 


4/12/18 08:00      Room Air  


 


4/12/18 07:00 96.3 69 16 107/57 (74) 96   


 


4/12/18 03:43 98.6 81 20 128/60 (82) 100   


 


4/12/18 00:05 98.1 80 16 121/59 (79) 97   


 


4/12/18 00:00      Room Air  














 4/12/18 4/12/18 4/12/18





 07:00 15:00 23:00


 


Intake Total 1142 ml 200 ml 480 ml


 


Output Total 0 ml  300 ml


 


Balance 1142 ml 200 ml 180 ml








Result Diagram:  


4/11/18 0835                                                                   

             4/12/18 0712





Laboratory Results





Laboratory Tests








Test


  4/12/18


07:12


 


Blood Urea Nitrogen 16 MG/DL 


 


Creatinine 0.76 MG/DL 


 


Random Glucose 72 MG/DL 


 


Total Protein 6.4 GM/DL 


 


Albumin 2.9 GM/DL 


 


Calcium Level 9.0 MG/DL 


 


Alkaline Phosphatase 156 U/L 


 


Aspartate Amino Transf


(AST/SGOT) 26 U/L 


 


 


Alanine Aminotransferase


(ALT/SGPT) 23 U/L 


 


 


Total Bilirubin 0.6 MG/DL 


 


Sodium Level 134 MEQ/L 


 


Potassium Level 4.1 MEQ/L 


 


Chloride Level 100 MEQ/L 


 


Carbon Dioxide Level 26.2 MEQ/L 


 


Anion Gap 8 MEQ/L 


 


Estimat Glomerular Filtration


Rate 98 ML/MIN 


 











Administered Medications








 Medications


  (Trade)  Dose


 Ordered  Sig/Melodie


 Route


 PRN Reason  Start Time


 Stop Time Status Last Admin


Dose Admin


 


 Sodium Chloride  1,000 ml @ 


 75 mls/hr  [id number]


 IV


   4/11/18 15:00


    4/12/18 17:23


 


 


 Sodium Chloride


  (NS Flush)  2 ml  BID


 IV FLUSH


   4/11/18 21:00


    4/12/18 10:35


 


 


 Heparin Sodium


  (Porcine)


  (Heparin Inj)  5,000 units  Q12H


 SQ


   4/11/18 15:00


    4/12/18 15:00


 


 


 Senna/Docusate


 Sodium


  (Giselle-Colace)  1 tab  BID


 PO


   4/11/18 21:00


    4/12/18 21:04


 








Objective Remarks


GENERAL: gaunt 


SKIN: Warm and dry.


HEAD: Normocephalic.


EYES: No scleral icterus. No injection or drainage. 


NECK: Supple, trachea midline. No JVD or lymphadenopathy.


LYMPHATIC: No adenopathy.


CARDIOVASCULAR: Regular rate and rhythm without murmurs. 


RESPIRATORY: Breath sounds equal bilaterally. No accessory muscle use.


GASTROINTESTINAL: Abdomen soft, non-tender, nondistended. 


EXTREMITIES: No cyanosis, or edema. 


MUSCULOSKELETAL: muscle wasting. 


NEUROLOGICAL: No obvious focal deficit. Awake, alert, and oriented x3.


PSYCHIATRIC: Appropriate mood and affect; insight and judgment normal.





Assessment/Plan


Assessment


1: esophageal cancer:  upper endo did not show tumor from what I understand.  

His recent pet scan was highly suggestive of recurrent disease and I wonder if 

we are dealing with disease outside of the lumen such as adenopathy.  Would 

very much like to see ultrasound evaluation and he may ultimately come to a 

surgical procedure for diagnosis, staging and hopefully a successful 

intervention.   Radiation or chemotherapy is not likely to have a lot to often 

him and I hope this is a surgical problem.   Await further evaluation.











Isacc Wheeler MD Apr 12, 2018 21:18

## 2018-04-13 VITALS
DIASTOLIC BLOOD PRESSURE: 57 MMHG | OXYGEN SATURATION: 96 % | RESPIRATION RATE: 16 BRPM | TEMPERATURE: 97.6 F | HEART RATE: 65 BPM | SYSTOLIC BLOOD PRESSURE: 116 MMHG

## 2018-04-13 VITALS
SYSTOLIC BLOOD PRESSURE: 107 MMHG | DIASTOLIC BLOOD PRESSURE: 59 MMHG | HEART RATE: 70 BPM | TEMPERATURE: 97.8 F | RESPIRATION RATE: 19 BRPM | OXYGEN SATURATION: 96 %

## 2018-04-13 VITALS
DIASTOLIC BLOOD PRESSURE: 55 MMHG | TEMPERATURE: 98.3 F | SYSTOLIC BLOOD PRESSURE: 117 MMHG | HEART RATE: 68 BPM | OXYGEN SATURATION: 96 % | RESPIRATION RATE: 16 BRPM

## 2018-04-13 VITALS
OXYGEN SATURATION: 97 % | SYSTOLIC BLOOD PRESSURE: 104 MMHG | HEART RATE: 62 BPM | DIASTOLIC BLOOD PRESSURE: 58 MMHG | TEMPERATURE: 98.7 F | RESPIRATION RATE: 18 BRPM

## 2018-04-13 VITALS
RESPIRATION RATE: 18 BRPM | HEART RATE: 76 BPM | TEMPERATURE: 98.4 F | SYSTOLIC BLOOD PRESSURE: 108 MMHG | OXYGEN SATURATION: 97 % | DIASTOLIC BLOOD PRESSURE: 56 MMHG

## 2018-04-13 VITALS
OXYGEN SATURATION: 97 % | HEART RATE: 61 BPM | SYSTOLIC BLOOD PRESSURE: 116 MMHG | RESPIRATION RATE: 19 BRPM | TEMPERATURE: 98.2 F | DIASTOLIC BLOOD PRESSURE: 56 MMHG

## 2018-04-13 VITALS
HEART RATE: 69 BPM | DIASTOLIC BLOOD PRESSURE: 57 MMHG | RESPIRATION RATE: 18 BRPM | SYSTOLIC BLOOD PRESSURE: 115 MMHG | OXYGEN SATURATION: 97 % | TEMPERATURE: 98.5 F

## 2018-04-13 RX ADMIN — STANDARDIZED SENNA CONCENTRATE AND DOCUSATE SODIUM SCH TAB: 8.6; 5 TABLET, FILM COATED ORAL at 21:00

## 2018-04-13 RX ADMIN — STANDARDIZED SENNA CONCENTRATE AND DOCUSATE SODIUM SCH TAB: 8.6; 5 TABLET, FILM COATED ORAL at 09:29

## 2018-04-13 RX ADMIN — THIAMINE HYDROCHLORIDE SCH MLS/HR: 100 INJECTION, SOLUTION INTRAMUSCULAR; INTRAVENOUS at 06:14

## 2018-04-13 RX ADMIN — Medication SCH ML: at 21:00

## 2018-04-13 RX ADMIN — Medication SCH ML: at 09:00

## 2018-04-13 RX ADMIN — HEPARIN SODIUM SCH UNITS: 10000 INJECTION, SOLUTION INTRAVENOUS; SUBCUTANEOUS at 15:00

## 2018-04-13 RX ADMIN — HEPARIN SODIUM SCH UNITS: 10000 INJECTION, SOLUTION INTRAVENOUS; SUBCUTANEOUS at 03:16

## 2018-04-13 RX ADMIN — THIAMINE HYDROCHLORIDE SCH MLS/HR: 100 INJECTION, SOLUTION INTRAMUSCULAR; INTRAVENOUS at 20:20

## 2018-04-13 NOTE — HHI.PR
Subjective


Remarks


Seen this am, reports no complaints. Spouse in room.





Objective





Vital Signs








  Date Time  Temp Pulse Resp B/P (MAP) Pulse Ox O2 Delivery O2 Flow Rate FiO2


 


4/13/18 11:03 97.6 65 16 116/57 (76) 96   


 


4/13/18 08:00      Room Air  


 


4/13/18 08:00 97.8 70 19 107/59 (75) 96   


 


4/13/18 07:14 98.3 68 16 117/55 (75) 96   


 


4/13/18 04:30 98.5 69 18 115/57 (76) 97   


 


4/13/18 00:00 98.4 76 18 108/56 (73) 97   


 


4/12/18 20:00 98.2 74 18 99/54 (69) 98   


 


4/12/18 19:45     98 Room Air  


 


4/12/18 16:03 98.0 64 18 129/61 (83) 98   


 


4/12/18 16:00      Room Air  


 


4/12/18 13:00 98.0 67 16 117/63 (81) 97   


 


4/12/18 12:00      Room Air  














I/O      


 


 4/12/18 4/12/18 4/12/18 4/13/18 4/13/18 4/13/18





 07:00 15:00 23:00 07:00 15:00 23:00


 


Intake Total 1142 ml 200 ml 480 ml 120 ml  


 


Output Total 0 ml  300 ml 400 ml  


 


Balance 1142 ml 200 ml 180 ml -280 ml  


 


      


 


Intake Oral 0 ml  480 ml 120 ml  


 


IV Total 1142 ml     


 


Other  200 ml    


 


Output Urine Total   300 ml 400 ml  


 


Stool Total 0 ml     


 


# Voids 3   1  


 


# Bowel Movements   0   








Result Diagram:  


4/11/18 0835                                                                   

             4/12/18 0712





Imaging





Last 72 hours Impressions








Chest X-Ray 4/11/18 0825 Signed





Impressions: 





 Service Date/Time:  Wednesday, April 11, 2018 08:35 - CONCLUSION:  New minimal 





 parenchymal changes right apex New from comparison study.     Diego Prince MD  FACR








Other Results


IMPRESSION: EGD 4/12/18


Esophagus patient has what looked look like mucosal tear, patient has 

significant nausea vomiting that's this could be Lisseth-Wheeler tear, she also 

has significant narrowing of the esophagus with thickening of the EG junction 

biopsy was done to rule out recurrent of esophageal cancer, dilation was not 

performed because of the tear in the mucosa and the EG junction


Stomach normal except some thickening at the EG junction on retroflexion biopsy 

was done


Duodenum normal


Objective Remarks


Physiical Exam


General: thin elderly no apparent distress


SKIN: Warm and dry.


HEAD: Normocephalic.


EYES: No scleral icterus. No injection or drainage. 


NECK: Supple, trachea midline. No JVD or lymphadenopathy.


LYMPHATIC: No adenopathy.


CARDIOVASCULAR: Regular rate and rhythm without murmurs. 


RESPIRATORY: Breath sounds equal bilaterally. No accessory muscle use.


GASTROINTESTINAL: Abdomen soft, non-tender, nondistended. 


EXTREMITIES: No cyanosis, or edema. 


MUSCULOSKELETAL: muscle wasting. 


NEUROLOGICAL: .Awake, alert, and oriented x3.


Medications and IVs





Current Medications








 Medications


  (Trade)  Dose


 Ordered  Sig/Melodie


 Route  Start Time


 Stop Time Status Last Admin


 


 Sodium Chloride  1,000 ml @ 


 75 mls/hr  [id number]


 IV  4/11/18 15:00


    4/12/18 17:23


 


 


  (NS Flush)  2 ml  UNSCH  PRN


 IV FLUSH  4/11/18 14:15


     


 


 


  (NS Flush)  2 ml  BID


 IV FLUSH  4/11/18 21:00


    4/12/18 10:35


 


 


  (Tylenol)  650 mg  Q4H  PRN


 PO  4/11/18 14:15


     


 


 


  (Zofran Inj)  4 mg  Q6H  PRN


 IVP  4/11/18 14:15


     


 


 


  (Heparin Inj)  5,000 units  Q12H


 SQ  4/11/18 15:00


    4/13/18 03:16


 


 


  (Narcan Inj)  0.4 mg  UNSCH  PRN


 IV PUSH  4/11/18 14:15


     


 


 


  (Giselle-Colace)  1 tab  BID


 PO  4/11/18 21:00


    4/13/18 09:29


 


 


  (Milk Of


 Magnesia Liq)  30 ml  Q12H  PRN


 PO  4/11/18 14:15


     


 


 


  (Senokot)  17.2 mg  Q12H  PRN


 PO  4/11/18 14:15


     


 


 


  (Dulcolax Supp)  10 mg  DAILY  PRN


 RECTAL  4/11/18 14:15


     


 


 


  (Lactulose Liq)  30 ml  DAILY  PRN


 PO  4/11/18 14:15


     


 











Assessment and Plan


Problem List:  


(1) Esophageal adenocarcinoma


ICD Codes:  C15.9 - Malignant neoplasm of esophagus, unspecified


Status:  Acute


Plan:  Oncology following, biopsy pending. 





(2) Esophageal obstruction


ICD Codes:  K22.2 - Esophageal obstruction


Status:  Acute


Plan:  Clear liq diet, Ensure added for increase of caloric intake





Discharge Planning


Likely home tomorrow if cleared by oncology and speech, tolerating thin fluids.











Vicky Lamb Apr 13, 2018 11:22

## 2018-04-13 NOTE — MB
cc:

Mathieu Bagley MD

****

 

 

DATE:

04/12/2018

 

REQUESTING PHYSICIAN:

Dr. Isacc Wheeler of medical oncology.

 

REASON FOR CONSULTATION:

Locally recurrent distal esophageal carcinoma.

 

HISTORY OF PRESENT ILLNESS:

The patient is an 82-year-old male who underwent chemoradiation for 

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 

esophagus.  This was done in mid 2016.  He had tolerated treatment 

well, had a complete response and due to his age and comorbidities 

including active tobacco and alcohol use, did not undergo surgical 

resection.  The patient was NAD undergoing a PET scan in 02/2018 which

showed a positive area in distal esophagus at the inferior edge of the

radiation field.  The patient also experienced significant weight loss

at this time up to 20 pounds due to dysphagia.  Due to the concern for

dehydration and malnutrition, a near obstructing mass, the patient was

admitted to [facility] for further evaluation.  The 

patient did undergo endoscopy.  The patient was found to have a 

mucosal tear type lesion, possibly Lisseth-Wheeler tear.  There is 

significant narrowing at the esophagus at the GE junction.  Biopsies 

were performed.  A dilation was done due to the possibility of a tear 

and making this worse.  Otherwise, no abnormalities.  The patient 

currently has no complaints.  He is tolerating a liquid diet.  He has 

no abdominal pain.

 

REVIEW OF SYSTEMS:

A 12-point review of systems was conducted with the patient and is 

negative except for the pertinent positives mentioned above in history

of present illness.

 

PAST MEDICAL HISTORY:

History of alcoholism, tobacco abuse, history of arthritis, history of

T3N0 squamous cell carcinoma completed treatment in 2016 as above.

 

PAST SURGICAL HISTORY:

Prostate cancer diagnosed 2015, status post seed placement, left 

inguinal hernia repair.

 

FAMILY HISTORY:

Noncontributory.

 

ALLERGIES:

POSSIBLE ALLERGY TO PENICILLIN.

 

SOCIAL HISTORY:

The patient denies illicit drug use.  Does use alcohol and cigarettes 

daily.

 

PHYSICAL EXAMINATION:

VITAL SIGNS:  Blood pressure 129/61, heart rate 64, respiratory rate 

18, temperature 98.0.

GENERAL:  The patient is a thin, slightly malnourished, chronically 

ill-appearing  male.

HEENT:  Head is normocephalic, atraumatic.  Pupils are round and 

reactive to accommodation and light.  Sclerae is anicteric.  Oral 

cavity is clear.  Airway is patent.

NECK:  Supple.  No JVD.  No lymphadenopathy.  No supraclavicular 

lymphadenopathy.  Breath sounds are decreased bilaterally, nonlabored 

breathing pattern.

HEART:  Regular rate and rhythm.

ABDOMEN:  Soft and nontender to palpation.  No masses.  No 

organomegaly.  No ascites.  No major surgery or scars.

RECTAL:  Exam is deferred.

BACK:  No CVA tenderness.

EXTREMITIES:  No clubbing, cyanosis or edema.  Chronic changes with 

chronic vascular disease.

NEUROLOGIC:  The patient is awake, alert and oriented x 3.  Nonfocal 

peripheral exam.  Cranial nerves 2-12 are grossly intact.  Mood, 

judgment and insight are intact.

 

ASSESSMENT AND PLAN:

The patient is an 82-year-old male with dysphagia with a history 

squamous cell carcinoma of the esophagus, status post definitive 

therapy.  The patient has a PET scan with possible concern for 

recurrence.  Endoscopy does show multiple abnormalities.  Biopsies are

pending.  I discussed with the patient about the possibility of 

recurrent cancer and that we would await the biopsy results.  I did 

discuss the possible management of the recurrent cancer with a salvage

esophagectomy.  I also discussed alternatives to this including 

palliation, including stent, including feeding tubes.  All questions 

were answered to his satisfaction.  We will followup on the biopsy 

results performed by GI and I do recommend the patient continue 

nutritious, but very thin liquid diet at this time.

 

Thank you very much for this consultation.  We will follow this 

patient along with you.

 

 

 

 

 

__________________________________

MD INOCENCIO Pathak/SHERMAN

D: 04/13/2018, 08:25 AM

T: 04/13/2018, 08:47 AM

Visit #: [id number]

Job #: [id number]

SAM

## 2018-04-13 NOTE — HHI.GIFU
Subjective


Remarks


Pt resting in bed. family at bedside.  No GI complaints.  Wants a beer.


 (Sonia Remy)





Objective


Vitals I&O





Vital Signs








  Date Time  Temp Pulse Resp B/P (MAP) Pulse Ox O2 Delivery O2 Flow Rate FiO2


 


4/13/18 11:03 97.6 65 16 116/57 (76) 96   


 


4/13/18 08:00      Room Air  


 


4/13/18 08:00 97.8 70 19 107/59 (75) 96   


 


4/13/18 07:14 98.3 68 16 117/55 (75) 96   


 


4/13/18 04:30 98.5 69 18 115/57 (76) 97   


 


4/13/18 00:00 98.4 76 18 108/56 (73) 97   


 


4/12/18 20:00 98.2 74 18 99/54 (69) 98   


 


4/12/18 19:45     98 Room Air  


 


4/12/18 16:03 98.0 64 18 129/61 (83) 98   


 


4/12/18 16:00      Room Air  


 


4/12/18 13:00 98.0 67 16 117/63 (81) 97   














I/O      


 


 4/12/18 4/12/18 4/12/18 4/13/18 4/13/18 4/13/18





 07:00 15:00 23:00 07:00 15:00 23:00


 


Intake Total 1142 ml 200 ml 480 ml 120 ml  


 


Output Total 0 ml  300 ml 400 ml  


 


Balance 1142 ml 200 ml 180 ml -280 ml  


 


      


 


Intake Oral 0 ml  480 ml 120 ml  


 


IV Total 1142 ml     


 


Other  200 ml    


 


Output Urine Total   300 ml 400 ml  


 


Stool Total 0 ml     


 


# Voids 3   1  


 


# Bowel Movements   0   








Laboratory





Laboratory Tests








Test


  4/13/18


06:07


 


Carcinoembryonic Antigen 4.2 








Imaging





Last Impressions








Chest X-Ray 4/11/18 0825 Signed





Impressions: 





 Service Date/Time:  Wednesday, April 11, 2018 08:35 - CONCLUSION:  New minimal 





 parenchymal changes right apex New from comparison study.     Diego Prince MD  FACR








Physical Exam


HEENT: PERRL; normocephalic; atraumatic; no jaundice.  


CHEST:  CTA, diminished


CARDIAC:  RRR


ABDOMEN:  Soft, nondistended, nontender; no hepatosplenomegaly; bowel sounds 

are present in all four quadrants.


EXTREMITIES: No clubbing, cyanosis, or edema.


SKIN:  Normal; no rash; no jaundice.


CNS:  No focal deficits; alert and oriented times three.


 (Sonia Remy)





Assessment and Plan


Plan


ASSESSMENT


- dysphagia, weight loss - poss malignancy.  2-3 weeks difficulty swallowing, 

regurgitates solids and must drink liquids slowly.  


    reportedly normal EGD 2017. recent PET scan suspicious for recurrent 

malignancy GE jxn.  CT 2/2018 showing


   enlargement previously seen mass GE jxn.  scheduled for EUS 4/27/18.  lost 

20lbs in last 3-4 wks





 4/12/18 IMPRESSION:


Esophagus patient has what looked look like mucosal tear, patient has 

significant nausea vomiting that's this could be Lisseth-Wheeler tear, she also 

has significant narrowing of the esophagus with thickening of the EG junction 

biopsy was done to rule out recurrent of esophageal cancer, dilation was not 

performed because of the tear in the mucosa and the EG junction


Stomach normal except some thickening at the EG junction on retroflexion biopsy 

was done


Duodenum normal





4/13/18  bx still pending.  CEA 4.2.  oncology following, GS following.  Pt 

with no complaints.





PLAN:


add ensure clear with trays


Await biopsy 


EUS for evaluation of the esophagus with EGD and dilation, next week as outpt


supportive care





pt seen by myself and Dr Alex and this note is on his behalf


 (Sonia Remy)


Plan


Patient seen and examined, agree with above note, he is feeling better, we will 

advance diet to pured diet, await biopsy results, EUS as an outpatient 

depending on the biopsy results


 (Nessa Alex MD)











Sonia Remy Apr 13, 2018 12:29


Nessa Alex MD Apr 13, 2018 16:50

## 2018-04-14 VITALS
HEART RATE: 63 BPM | SYSTOLIC BLOOD PRESSURE: 124 MMHG | RESPIRATION RATE: 18 BRPM | TEMPERATURE: 97.4 F | OXYGEN SATURATION: 98 % | DIASTOLIC BLOOD PRESSURE: 62 MMHG

## 2018-04-14 VITALS
HEART RATE: 62 BPM | DIASTOLIC BLOOD PRESSURE: 60 MMHG | OXYGEN SATURATION: 95 % | SYSTOLIC BLOOD PRESSURE: 112 MMHG | TEMPERATURE: 98 F | RESPIRATION RATE: 18 BRPM

## 2018-04-14 VITALS
HEART RATE: 72 BPM | SYSTOLIC BLOOD PRESSURE: 109 MMHG | DIASTOLIC BLOOD PRESSURE: 62 MMHG | TEMPERATURE: 97.8 F | RESPIRATION RATE: 20 BRPM

## 2018-04-14 VITALS
OXYGEN SATURATION: 100 % | RESPIRATION RATE: 18 BRPM | DIASTOLIC BLOOD PRESSURE: 60 MMHG | HEART RATE: 61 BPM | TEMPERATURE: 97.5 F | SYSTOLIC BLOOD PRESSURE: 109 MMHG

## 2018-04-14 VITALS
RESPIRATION RATE: 20 BRPM | OXYGEN SATURATION: 97 % | DIASTOLIC BLOOD PRESSURE: 55 MMHG | HEART RATE: 67 BPM | TEMPERATURE: 97.7 F | SYSTOLIC BLOOD PRESSURE: 115 MMHG

## 2018-04-14 VITALS
OXYGEN SATURATION: 97 % | TEMPERATURE: 97.3 F | RESPIRATION RATE: 18 BRPM | DIASTOLIC BLOOD PRESSURE: 58 MMHG | HEART RATE: 62 BPM | SYSTOLIC BLOOD PRESSURE: 123 MMHG

## 2018-04-14 VITALS
SYSTOLIC BLOOD PRESSURE: 104 MMHG | RESPIRATION RATE: 18 BRPM | DIASTOLIC BLOOD PRESSURE: 55 MMHG | HEART RATE: 61 BPM | OXYGEN SATURATION: 95 % | TEMPERATURE: 99.4 F

## 2018-04-14 RX ADMIN — THIAMINE HYDROCHLORIDE SCH MLS/HR: 100 INJECTION, SOLUTION INTRAMUSCULAR; INTRAVENOUS at 20:59

## 2018-04-14 RX ADMIN — STANDARDIZED SENNA CONCENTRATE AND DOCUSATE SODIUM SCH TAB: 8.6; 5 TABLET, FILM COATED ORAL at 20:56

## 2018-04-14 RX ADMIN — Medication SCH ML: at 20:56

## 2018-04-14 RX ADMIN — Medication SCH ML: at 08:27

## 2018-04-14 RX ADMIN — HEPARIN SODIUM SCH UNITS: 10000 INJECTION, SOLUTION INTRAVENOUS; SUBCUTANEOUS at 03:00

## 2018-04-14 RX ADMIN — STANDARDIZED SENNA CONCENTRATE AND DOCUSATE SODIUM SCH TAB: 8.6; 5 TABLET, FILM COATED ORAL at 08:27

## 2018-04-14 RX ADMIN — THIAMINE HYDROCHLORIDE SCH MLS/HR: 100 INJECTION, SOLUTION INTRAMUSCULAR; INTRAVENOUS at 08:27

## 2018-04-14 RX ADMIN — HEPARIN SODIUM SCH UNITS: 10000 INJECTION, SOLUTION INTRAVENOUS; SUBCUTANEOUS at 15:13

## 2018-04-14 NOTE — HHI.PR
Subjective


Remarks


Patient seen and examined. He has esophageal tear and problems swallowing, He 

has H/O esophageal cancer and recent biopsies are pending.





Objective





Vital Signs








  Date Time  Temp Pulse Resp B/P (MAP) Pulse Ox O2 Delivery O2 Flow Rate FiO2


 


4/14/18 08:00      Room Air  


 


4/14/18 04:00 97.5 61 18 109/60 (76) 100   


 


4/14/18 00:00 99.4 61 18 104/55 (71) 95   


 


4/13/18 20:15     98 Room Air  


 


4/13/18 20:00 98.7 62 18 104/58 (73) 97   


 


4/13/18 16:00 98.2 61 19 116/56 (76) 97   


 


4/13/18 16:00      Room Air  














I/O      


 


 4/13/18 4/13/18 4/13/18 4/14/18 4/14/18 4/14/18





 07:00 15:00 23:00 07:00 15:00 23:00


 


Intake Total 120 ml  1200 ml   


 


Output Total 400 ml     


 


Balance -280 ml  1200 ml   


 


      


 


Intake Oral 120 ml  1200 ml   


 


Output Urine Total 400 ml     


 


# Voids 1  3 3  








Result Diagram:  


4/11/18 0835                                                                   

             4/12/18 0712





Imaging





Last Impressions








Chest X-Ray 4/11/18 0825 Signed





Impressions: 





 Service Date/Time:  Wednesday, April 11, 2018 08:35 - CONCLUSION:  New minimal 





 parenchymal changes right apex New from comparison study.     Diego Prince MD  FACR








Procedures


EGD


Objective Remarks


HEENT - Afebrile with AT/NC head


Lungs - CTA


CV - RRR without rub or gallop


Abd - Soft and nontender with active BS


Neuro - alert and oriented


Medications and IVs





Current Medications








 Medications


  (Trade)  Dose


 Ordered  Sig/Melodie


 Route  Start Time


 Stop Time Status Last Admin


 


 Sodium Chloride  1,000 ml @ 


 75 mls/hr  [id number]


 IV  4/11/18 15:00


    4/14/18 08:27


 


 


  (NS Flush)  2 ml  UNSCH  PRN


 IV FLUSH  4/11/18 14:15


     


 


 


  (NS Flush)  2 ml  BID


 IV FLUSH  4/11/18 21:00


    4/12/18 10:35


 


 


  (Tylenol)  650 mg  Q4H  PRN


 PO  4/11/18 14:15


     


 


 


  (Zofran Inj)  4 mg  Q6H  PRN


 IVP  4/11/18 14:15


     


 


 


  (Heparin Inj)  5,000 units  Q12H


 SQ  4/11/18 15:00


    4/14/18 03:00


 


 


  (Narcan Inj)  0.4 mg  UNSCH  PRN


 IV PUSH  4/11/18 14:15


     


 


 


  (Giselle-Colace)  1 tab  BID


 PO  4/11/18 21:00


    4/13/18 09:29


 


 


  (Milk Of


 Magnesia Liq)  30 ml  Q12H  PRN


 PO  4/11/18 14:15


     


 


 


  (Senokot)  17.2 mg  Q12H  PRN


 PO  4/11/18 14:15


     


 


 


  (Dulcolax Supp)  10 mg  DAILY  PRN


 RECTAL  4/11/18 14:15


     


 


 


  (Lactulose Liq)  30 ml  DAILY  PRN


 PO  4/11/18 14:15


     


 











Assessment and Plan


Problem List:  


(1) Esophageal adenocarcinoma


ICD Codes:  C15.9 - Malignant neoplasm of esophagus, unspecified


Status:  Chronic


Plan:  Eval and plan per Onc and surgery pending esophageal biopsies. May need 

resection vs palliative care





(2) Esophageal obstruction


ICD Codes:  K22.2 - Esophageal obstruction


Status:  Acute


Plan:  Only tolerating liquids. Discussed feeding tube but awaiting bx results 

for now given his H/O esophageal cancer





Discussed Condition With


Patient and spouse


Discharge Planning


Home











Juarez Mccoy Apr 14, 2018 14:21

## 2018-04-14 NOTE — HHI.GIFU
Subjective


Remarks


Pt resting in bed, arguing with wife.  Asking for ensure.


 (Sonia Remy)





Objective


Vitals I&O





Vital Signs








  Date Time  Temp Pulse Resp B/P (MAP) Pulse Ox O2 Delivery O2 Flow Rate FiO2


 


4/14/18 08:00      Room Air  


 


4/14/18 04:00 97.5 61 18 109/60 (76) 100   


 


4/14/18 00:00 99.4 61 18 104/55 (71) 95   


 


4/13/18 20:15     98 Room Air  


 


4/13/18 20:00 98.7 62 18 104/58 (73) 97   


 


4/13/18 16:00 98.2 61 19 116/56 (76) 97   


 


4/13/18 16:00      Room Air  


 


4/13/18 12:00      Room Air  














I/O      


 


 4/13/18 4/13/18 4/13/18 4/14/18 4/14/18 4/14/18





 07:00 15:00 23:00 07:00 15:00 23:00


 


Intake Total 120 ml  1200 ml   


 


Output Total 400 ml     


 


Balance -280 ml  1200 ml   


 


      


 


Intake Oral 120 ml  1200 ml   


 


Output Urine Total 400 ml     


 


# Voids 1  3 3  








Laboratory





Laboratory Tests








Test


  4/11/18


08:35 4/12/18


07:12 4/13/18


06:07


 


White Blood Count 7.9 TH/MM3   


 


Red Blood Count 4.11 MIL/MM3   


 


Hemoglobin 13.6 GM/DL   


 


Hematocrit 39.9 %   


 


Mean Corpuscular Volume 97.2 FL   


 


Mean Corpuscular Hemoglobin 33.0 PG   


 


Mean Corpuscular Hemoglobin


Concent 34.0 % 


  


  


 


 


Red Cell Distribution Width 13.2 %   


 


Platelet Count 199 TH/MM3   


 


Mean Platelet Volume 7.8 FL   


 


Neutrophils (%) (Auto) 76.7 %   


 


Lymphocytes (%) (Auto) 14.9 %   


 


Monocytes (%) (Auto) 6.7 %   


 


Eosinophils (%) (Auto) 1.4 %   


 


Basophils (%) (Auto) 0.3 %   


 


Neutrophils # (Auto) 6.0 TH/MM3   


 


Lymphocytes # (Auto) 1.2 TH/MM3   


 


Monocytes # (Auto) 0.5 TH/MM3   


 


Eosinophils # (Auto) 0.1 TH/MM3   


 


Basophils # (Auto) 0.0 TH/MM3   


 


CBC Comment DIFF FINAL   


 


Differential Comment    


 


Prothrombin Time 11.6 SEC   


 


Prothromb Time International


Ratio 1.1 RATIO 


  


  


 


 


Activated Partial


Thromboplast Time 27.0 SEC 


  


  


 


 


Blood Urea Nitrogen  16 MG/DL  


 


Creatinine  0.76 MG/DL  


 


Random Glucose  72 MG/DL  


 


Total Protein  6.4 GM/DL  


 


Albumin  2.9 GM/DL  


 


Calcium Level  9.0 MG/DL  


 


Alkaline Phosphatase  156 U/L  


 


Aspartate Amino Transf


(AST/SGOT) 


  26 U/L 


  


 


 


Alanine Aminotransferase


(ALT/SGPT) 


  23 U/L 


  


 


 


Total Bilirubin  0.6 MG/DL  


 


Sodium Level  134 MEQ/L  


 


Potassium Level  4.1 MEQ/L  


 


Chloride Level  100 MEQ/L  


 


Carbon Dioxide Level  26.2 MEQ/L  


 


Anion Gap  8 MEQ/L  


 


Estimat Glomerular Filtration


Rate 


  98 ML/MIN 


  


 


 


Carcinoembryonic Antigen   4.2 NG/ML 








Imaging





Last Impressions








Chest X-Ray 4/11/18 0825 Signed





Impressions: 





 Service Date/Time:  Wednesday, April 11, 2018 08:35 - CONCLUSION:  New minimal 





 parenchymal changes right apex New from comparison study.     Diego Prince MD  FACR








Physical Exam


HEENT: PERRL; normocephalic; atraumatic; no jaundice.  


CHEST:  CTA, diminished


CARDIAC:  RRR


ABDOMEN:  Soft, nondistended, nontender; no hepatosplenomegaly; bowel sounds 

are present in all four quadrants.


EXTREMITIES: No clubbing, cyanosis, or edema.


SKIN:  Normal; no rash; no jaundice.


CNS:  No focal deficits; alert and oriented times three.


 (Sonia Remy [facility])





Assessment and Plan


Plan


ASSESSMENT


- dysphagia, weight loss - poss malignancy.  2-3 weeks difficulty swallowing, 

regurgitates solids and must drink liquids slowly.  


    reportedly normal EGD 2017. recent PET scan suspicious for recurrent 

malignancy GE jxn.  CT 2/2018 showing


   enlargement previously seen mass GE jxn.  scheduled for EUS 4/27/18.  lost 

20lbs in last 3-4 wks





 4/12/18 IMPRESSION:


Esophagus patient has what looked look like mucosal tear, patient has 

significant nausea vomiting that's this could be Lisseth-Wheeler tear, she also 

has significant narrowing of the esophagus with thickening of the EG junction 

biopsy was done to rule out recurrent of esophageal cancer, dilation was not 

performed because of the tear in the mucosa and the EG junction


Stomach normal except some thickening at the EG junction on retroflexion biopsy 

was done


Duodenum normal





4/13/18  bx still pending.  CEA 4.2.  oncology following, GS following.  Pt 

with no complaints.


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  pt 

wants ensure.





PLAN


- pureed diet


- ensure


- supportive care


- outpt EUS?





pt seen by myself and Dr Alex and this note is on his behalf


 (Sonia Remy)


Plan


Patient was seen and examined, agree with above note, blended diet, he denied 

any abdominal pain or any symptoms, will see how he tolerates a pured diet, if 

he tolerated well can be discharged home and follow-up as an outpatient for EUS


 (Nessa Alex MD)











Sonia Remy Apr 14, 2018 11:26


Nessa Alex MD Apr 14, 2018 14:04

## 2018-04-14 NOTE — HHI.PR
__________________________________________________





Subjective


Subjective Notes


No complaints; patient attempting to eat as much as possible with pureed diet





Objective


Vitals/I&O





Vital Signs








  Date Time  Temp Pulse Resp B/P (MAP) Pulse Ox O2 Delivery O2 Flow Rate FiO2


 


4/14/18 12:02 98.0 62 18 112/60 (77) 95   


 


4/14/18 08:00      Room Air  








Abdomen:  Non-distended





A/P


Problem List:  


(1) Esophageal obstruction


ICD Codes:  K22.2 - Esophageal obstruction


Status:  Acute


(2) History of esophageal cancer


ICD Codes:  Z85.01 - Personal history of malignant neoplasm of esophagus


Status:  Acute


(3) Esophageal adenocarcinoma


ICD Codes:  C15.9 - Malignant neoplasm of esophagus, unspecified


Status:  Chronic


Assessment and Plan


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





Awaiting biopsy results





Dr. Bagley will see again Monday, 4/16











Justin Rodriguez MD Apr 14, 2018 19:00

## 2018-04-15 VITALS
DIASTOLIC BLOOD PRESSURE: 51 MMHG | OXYGEN SATURATION: 96 % | HEART RATE: 70 BPM | RESPIRATION RATE: 20 BRPM | SYSTOLIC BLOOD PRESSURE: 95 MMHG | TEMPERATURE: 97.8 F

## 2018-04-15 VITALS
TEMPERATURE: 97.6 F | OXYGEN SATURATION: 98 % | SYSTOLIC BLOOD PRESSURE: 102 MMHG | DIASTOLIC BLOOD PRESSURE: 50 MMHG | HEART RATE: 67 BPM | RESPIRATION RATE: 20 BRPM

## 2018-04-15 VITALS
HEART RATE: 67 BPM | OXYGEN SATURATION: 97 % | TEMPERATURE: 98.2 F | SYSTOLIC BLOOD PRESSURE: 96 MMHG | RESPIRATION RATE: 18 BRPM | DIASTOLIC BLOOD PRESSURE: 54 MMHG

## 2018-04-15 VITALS
TEMPERATURE: 97.5 F | DIASTOLIC BLOOD PRESSURE: 56 MMHG | SYSTOLIC BLOOD PRESSURE: 116 MMHG | HEART RATE: 78 BPM | OXYGEN SATURATION: 97 % | RESPIRATION RATE: 20 BRPM

## 2018-04-15 VITALS
TEMPERATURE: 97.6 F | HEART RATE: 62 BPM | SYSTOLIC BLOOD PRESSURE: 100 MMHG | OXYGEN SATURATION: 96 % | RESPIRATION RATE: 18 BRPM | DIASTOLIC BLOOD PRESSURE: 63 MMHG

## 2018-04-15 VITALS
HEART RATE: 75 BPM | OXYGEN SATURATION: 96 % | SYSTOLIC BLOOD PRESSURE: 120 MMHG | DIASTOLIC BLOOD PRESSURE: 67 MMHG | RESPIRATION RATE: 20 BRPM | TEMPERATURE: 97.2 F

## 2018-04-15 RX ADMIN — THIAMINE HYDROCHLORIDE SCH MLS/HR: 100 INJECTION, SOLUTION INTRAMUSCULAR; INTRAVENOUS at 12:10

## 2018-04-15 RX ADMIN — HEPARIN SODIUM SCH UNITS: 10000 INJECTION, SOLUTION INTRAVENOUS; SUBCUTANEOUS at 03:16

## 2018-04-15 RX ADMIN — STANDARDIZED SENNA CONCENTRATE AND DOCUSATE SODIUM SCH TAB: 8.6; 5 TABLET, FILM COATED ORAL at 21:10

## 2018-04-15 RX ADMIN — Medication SCH ML: at 08:27

## 2018-04-15 RX ADMIN — HEPARIN SODIUM SCH UNITS: 10000 INJECTION, SOLUTION INTRAVENOUS; SUBCUTANEOUS at 15:38

## 2018-04-15 RX ADMIN — Medication SCH ML: at 21:10

## 2018-04-15 RX ADMIN — STANDARDIZED SENNA CONCENTRATE AND DOCUSATE SODIUM SCH TAB: 8.6; 5 TABLET, FILM COATED ORAL at 08:26

## 2018-04-15 NOTE — HHI.PR
cc:   Swapnil Sims MD


__________________________________________________





Subjective


Subjective Notes


no acute issues, tolerating diet





Objective


Vitals/I&O





Vital Signs








  Date Time  Temp Pulse Resp B/P (MAP) Pulse Ox O2 Delivery O2 Flow Rate FiO2


 


4/15/18 08:00 97.2 75 20 120/67 (84) 96   


 


4/14/18 20:30      Room Air  








Lungs:  Clear


Abdomen:  Non-tender





A/P


Problem List:  


(1) Esophageal obstruction


ICD Codes:  K22.2 - Esophageal obstruction


Status:  Acute


(2) History of esophageal cancer


ICD Codes:  Z85.01 - Personal history of malignant neoplasm of esophagus


Status:  Acute


(3) Esophageal adenocarcinoma


ICD Codes:  C15.9 - Malignant neoplasm of esophagus, unspecified


Status:  Chronic


Assessment and Plan


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





Awaiting biopsy results negative for cancer





Dr. Bagley will see again Monday, 4/16











Swapnil Sims MD Apr 15, 2018 10:14

## 2018-04-15 NOTE — HHI.GIFU
Subjective


Remarks


Pt is resting in bed, tolerating regular diet okay but has to be careful and 

take his time. No nausea, vomiting or abd pain 


 (Pascual Grover)





Objective


Vitals I&O





Vital Signs








  Date Time  Temp Pulse Resp B/P (MAP) Pulse Ox O2 Delivery O2 Flow Rate FiO2


 


4/15/18 08:00      Room Air  


 


4/15/18 08:00 97.2 75 20 120/67 (84) 96   


 


4/15/18 04:00 97.6 62 18 100/63 (75) 96   


 


4/15/18 00:00 97.6 67 20 102/50 (67) 98   


 


4/14/18 20:30      Room Air  


 


4/14/18 20:00 97.7 67 20 115/55 (75) 97   


 


4/14/18 16:02 97.4 63 18 124/62 (82) 98   














I/O      


 


 4/14/18 4/14/18 4/14/18 4/15/18 4/15/18 4/15/18





 07:00 15:00 23:00 07:00 15:00 23:00


 


Intake Total   380 ml   


 


Output Total   800 ml   


 


Balance   -420 ml   


 


      


 


Intake Oral   380 ml   


 


Output Urine Total   800 ml   


 


# Voids 3   2  


 


# Bowel Movements   0   








Imaging





Last Impressions








Chest X-Ray 4/11/18 0825 Signed





Impressions: 





 Service Date/Time:  Wednesday, April 11, 2018 08:35 - CONCLUSION:  New minimal 





 parenchymal changes right apex New from comparison study.     Diego Prince MD  FACR








Physical Exam


HEENT: PERRL; normocephalic; atraumatic; no jaundice.  


CHEST:  CTA, diminished


CARDIAC:  RRR


ABDOMEN:  Soft, nondistended, nontender; no hepatosplenomegaly; bowel sounds 

are present in all four quadrants.


EXTREMITIES: No clubbing, cyanosis, or edema.


SKIN:  Normal; no rash; no jaundice.


CNS:  No focal deficits; alert and oriented times three.


 (Pascual Grover)





Assessment and Plan


Plan


ASSESSMENT


- dysphagia, weight loss - poss malignancy.  2-3 weeks difficulty swallowing, 

regurgitates solids and must drink liquids slowly.  


    reportedly normal EGD 2017. recent PET scan suspicious for recurrent 

malignancy GE jxn.  CT 2/2018 showing


   enlargement previously seen mass GE jxn.  scheduled for EUS 4/27/18.  lost 

20lbs in last 3-4 wks





 4/12/18 IMPRESSION:


Esophagus patient has what looked look like mucosal tear, patient has 

significant nausea vomiting that's this could be Lisseth-Wheeler tear, she also 

has significant narrowing of the esophagus with thickening of the EG junction 

biopsy was done to rule out recurrent of esophageal cancer, dilation was not 

performed because of the tear in the mucosa and the EG junction


Stomach normal except some thickening at the EG junction on retroflexion biopsy 

was done


Duodenum normal





4/13/18  bx still pending.  CEA 4.2.  oncology following, GS following.  Pt 

with no complaints.


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  pt 

wants ensure.


4/15/18 Pt doing good, tolerating diet okay. 





PLAN


- pureed diet


- ensure


- supportive care


- GS on the case, Dr. Bagley suppose to see pt tomorrow and decide whether 

or not to have


  surgical resection 


- outpt EUS


- GI will sign off


- F/u with GI as an OP





pt seen by myself and Dr Alex and this note is on his behalf


 (Pascual Grover)


Plan


Patient was seen and examined, agree with above note, patient is going to see 

Dr. Santana in follow-up from surgery tomorrow, biopsy was negative for 

malignancy but that could be false negative and EUS should be done as an 

outpatient to examine the esophagus closely and possibly perform dilation at 

that time


 (Nessa Alex MD)











Pascual Grover Apr 15, 2018 12:19


Nessa Alex MD Apr 15, 2018 15:27

## 2018-04-15 NOTE — HHI.PR
Subjective


Remarks


Patient seen and examined. He has esophageal tear and problems swallowing, He 

has H/O esophageal cancer and recent biopsies are pending. Surgery is to eval 

tomorrow and decide if surgery is indicated at this time.





Objective





Vital Signs








  Date Time  Temp Pulse Resp B/P (MAP) Pulse Ox O2 Delivery O2 Flow Rate FiO2


 


4/15/18 08:00      Room Air  


 


4/15/18 08:00 97.2 75 20 120/67 (84) 96   


 


4/15/18 04:00 97.6 62 18 100/63 (75) 96   


 


4/15/18 00:00 97.6 67 20 102/50 (67) 98   


 


4/14/18 20:30      Room Air  


 


4/14/18 20:00 97.7 67 20 115/55 (75) 97   


 


4/14/18 16:02 97.4 63 18 124/62 (82) 98   














I/O      


 


 4/14/18 4/14/18 4/14/18 4/15/18 4/15/18 4/15/18





 07:00 15:00 23:00 07:00 15:00 23:00


 


Intake Total   380 ml   


 


Output Total   800 ml   


 


Balance   -420 ml   


 


      


 


Intake Oral   380 ml   


 


Output Urine Total   800 ml   


 


# Voids 3   2  


 


# Bowel Movements   0   








Result Diagram:  


4/11/18 0835                                                                   

             4/12/18 0712





Procedures


EGD


Objective Remarks


HEENT - Afebrile with AT/NC head


Lungs - CTA


CV - RRR without rub or gallop


Abd - Soft and nontender with active BS


Neuro - alert and oriented


Medications and IVs





Current Medications








 Medications


  (Trade)  Dose


 Ordered  Sig/Melodie


 Route  Start Time


 Stop Time Status Last Admin


 


 Sodium Chloride  1,000 ml @ 


 75 mls/hr  [id number]


 IV  4/11/18 15:00


    4/14/18 20:59


 


 


  (NS Flush)  2 ml  UNSCH  PRN


 IV FLUSH  4/11/18 14:15


     


 


 


  (NS Flush)  2 ml  BID


 IV FLUSH  4/11/18 21:00


    4/12/18 10:35


 


 


  (Tylenol)  650 mg  Q4H  PRN


 PO  4/11/18 14:15


     


 


 


  (Zofran Inj)  4 mg  Q6H  PRN


 IVP  4/11/18 14:15


     


 


 


  (Heparin Inj)  5,000 units  Q12H


 SQ  4/11/18 15:00


    4/15/18 03:16


 


 


  (Narcan Inj)  0.4 mg  UNSCH  PRN


 IV PUSH  4/11/18 14:15


     


 


 


  (Giselle-Colace)  1 tab  BID


 PO  4/11/18 21:00


    4/15/18 08:26


 


 


  (Milk Of


 Magnesia Liq)  30 ml  Q12H  PRN


 PO  4/11/18 14:15


     


 


 


  (Senokot)  17.2 mg  Q12H  PRN


 PO  4/11/18 14:15


     


 


 


  (Dulcolax Supp)  10 mg  DAILY  PRN


 RECTAL  4/11/18 14:15


     


 


 


  (Lactulose Liq)  30 ml  DAILY  PRN


 PO  4/11/18 14:15


     


 











Assessment and Plan


Problem List:  


(1) Esophageal adenocarcinoma


ICD Codes:  C15.9 - Malignant neoplasm of esophagus, unspecified


Status:  Chronic


Plan:  Eval and plan per Onc and surgery pending esophageal biopsies. May need 

resection vs palliative care





(2) Esophageal obstruction


ICD Codes:  K22.2 - Esophageal obstruction


Status:  Acute


Plan:  Only tolerating liquids. Discussed feeding tube but awaiting bx results 

for now given his H/O esophageal cancer. Surgery indicated they will F/U 

results tomorrow and decide about need for surgery.





Assessment and Plan


Esophageal cancer now with swallowing problems. Bx done and surgery to see 

tomorrow to decide about appropriate treatment.


Discussed Condition With


Patient


Discharge Planning


HOme











Juarez Mccoy Apr 15, 2018 14:12

## 2018-04-16 VITALS
HEART RATE: 67 BPM | OXYGEN SATURATION: 95 % | TEMPERATURE: 98.1 F | RESPIRATION RATE: 18 BRPM | DIASTOLIC BLOOD PRESSURE: 52 MMHG | SYSTOLIC BLOOD PRESSURE: 103 MMHG

## 2018-04-16 VITALS
SYSTOLIC BLOOD PRESSURE: 103 MMHG | OXYGEN SATURATION: 95 % | TEMPERATURE: 97.8 F | RESPIRATION RATE: 20 BRPM | HEART RATE: 66 BPM | DIASTOLIC BLOOD PRESSURE: 55 MMHG

## 2018-04-16 VITALS
SYSTOLIC BLOOD PRESSURE: 113 MMHG | HEART RATE: 66 BPM | RESPIRATION RATE: 20 BRPM | DIASTOLIC BLOOD PRESSURE: 54 MMHG | OXYGEN SATURATION: 98 % | TEMPERATURE: 97.3 F

## 2018-04-16 VITALS
SYSTOLIC BLOOD PRESSURE: 107 MMHG | HEART RATE: 68 BPM | DIASTOLIC BLOOD PRESSURE: 56 MMHG | TEMPERATURE: 97.4 F | OXYGEN SATURATION: 97 % | RESPIRATION RATE: 20 BRPM

## 2018-04-16 VITALS
HEART RATE: 62 BPM | DIASTOLIC BLOOD PRESSURE: 51 MMHG | TEMPERATURE: 98.5 F | OXYGEN SATURATION: 96 % | SYSTOLIC BLOOD PRESSURE: 95 MMHG | RESPIRATION RATE: 18 BRPM

## 2018-04-16 VITALS
HEART RATE: 67 BPM | TEMPERATURE: 97.6 F | SYSTOLIC BLOOD PRESSURE: 96 MMHG | RESPIRATION RATE: 20 BRPM | DIASTOLIC BLOOD PRESSURE: 53 MMHG | OXYGEN SATURATION: 97 %

## 2018-04-16 LAB
BASOPHILS # BLD AUTO: 0 TH/MM3 (ref 0–0.2)
BASOPHILS NFR BLD: 0.4 % (ref 0–2)
BUN SERPL-MCNC: 13 MG/DL (ref 7–18)
CALCIUM SERPL-MCNC: 9.1 MG/DL (ref 8.5–10.1)
CHLORIDE SERPL-SCNC: 101 MEQ/L (ref 98–107)
CREAT SERPL-MCNC: 0.72 MG/DL (ref 0.6–1.3)
EOSINOPHIL # BLD: 0.1 TH/MM3 (ref 0–0.4)
EOSINOPHIL NFR BLD: 1.9 % (ref 0–4)
ERYTHROCYTE [DISTWIDTH] IN BLOOD BY AUTOMATED COUNT: 12.7 % (ref 11.6–17.2)
GFR SERPLBLD BASED ON 1.73 SQ M-ARVRAT: 105 ML/MIN (ref 89–?)
GLUCOSE SERPL-MCNC: 92 MG/DL (ref 74–106)
HCO3 BLD-SCNC: 31.7 MEQ/L (ref 21–32)
HCT VFR BLD CALC: 37.3 % (ref 39–51)
HGB BLD-MCNC: 12.8 GM/DL (ref 13–17)
LYMPHOCYTES # BLD AUTO: 1.4 TH/MM3 (ref 1–4.8)
LYMPHOCYTES NFR BLD AUTO: 17.3 % (ref 9–44)
MCH RBC QN AUTO: 33 PG (ref 27–34)
MCHC RBC AUTO-ENTMCNC: 34.3 % (ref 32–36)
MCV RBC AUTO: 96.1 FL (ref 80–100)
MONOCYTE #: 0.7 TH/MM3 (ref 0–0.9)
MONOCYTES NFR BLD: 9.2 % (ref 0–8)
NEUTROPHILS # BLD AUTO: 5.6 TH/MM3 (ref 1.8–7.7)
NEUTROPHILS NFR BLD AUTO: 71.2 % (ref 16–70)
PLATELET # BLD: 166 TH/MM3 (ref 150–450)
PMV BLD AUTO: 8 FL (ref 7–11)
RBC # BLD AUTO: 3.88 MIL/MM3 (ref 4.5–5.9)
SODIUM SERPL-SCNC: 137 MEQ/L (ref 136–145)
WBC # BLD AUTO: 7.8 TH/MM3 (ref 4–11)

## 2018-04-16 RX ADMIN — STANDARDIZED SENNA CONCENTRATE AND DOCUSATE SODIUM SCH TAB: 8.6; 5 TABLET, FILM COATED ORAL at 08:26

## 2018-04-16 RX ADMIN — HEPARIN SODIUM SCH UNITS: 10000 INJECTION, SOLUTION INTRAVENOUS; SUBCUTANEOUS at 02:37

## 2018-04-16 RX ADMIN — Medication SCH ML: at 08:26

## 2018-04-16 RX ADMIN — Medication SCH ML: at 21:12

## 2018-04-16 RX ADMIN — HEPARIN SODIUM SCH UNITS: 10000 INJECTION, SOLUTION INTRAVENOUS; SUBCUTANEOUS at 14:38

## 2018-04-16 RX ADMIN — THIAMINE HYDROCHLORIDE SCH MLS/HR: 100 INJECTION, SOLUTION INTRAMUSCULAR; INTRAVENOUS at 14:36

## 2018-04-16 RX ADMIN — STANDARDIZED SENNA CONCENTRATE AND DOCUSATE SODIUM SCH TAB: 8.6; 5 TABLET, FILM COATED ORAL at 21:00

## 2018-04-16 RX ADMIN — THIAMINE HYDROCHLORIDE SCH MLS/HR: 100 INJECTION, SOLUTION INTRAMUSCULAR; INTRAVENOUS at 01:40

## 2018-04-16 NOTE — HHI.PR
Subjective


Remarks


reports no complaints, tolerating pureed diet, does not care for food. Spouse 

in room.





Objective





Vital Signs








  Date Time  Temp Pulse Resp B/P (MAP) Pulse Ox O2 Delivery O2 Flow Rate FiO2


 


4/16/18 04:00 98.1 67 18 103/52 (69) 95   


 


4/16/18 00:00 98.5 62 18 95/51 (66) 96   


 


4/15/18 20:00      Room Air  


 


4/15/18 20:00 98.2 67 18 96/54 (68) 97   


 


4/15/18 16:00 97.8 70 20 95/51 (66) 96   


 


4/15/18 12:00 97.5 78 20 116/56 (76) 97   














I/O      


 


 4/15/18 4/15/18 4/15/18 4/16/18 4/16/18 4/16/18





 07:00 15:00 23:00 07:00 15:00 23:00


 


Intake Total   480 ml 240 ml  


 


Balance   480 ml 240 ml  


 


      


 


Intake Oral   480 ml 240 ml  


 


# Voids 2  5 2  


 


# Bowel Movements   0 0  








Result Diagram:  


4/12/18 0712





Procedures


EGD


Objective Remarks


Physical Exam


General: thin elderly no apparent distress


SKIN: Warm and dry.


HEAD: Normocephalic.


EYES: No scleral icterus. No injection or drainage. 


NECK: Supple, trachea midline. No JVD or lymphadenopathy.


LYMPHATIC: No adenopathy.


CARDIOVASCULAR: Regular rate and rhythm without murmurs. 


RESPIRATORY: Breath sounds equal bilaterally. No accessory muscle use.


GASTROINTESTINAL: Abdomen soft, non-tender, nondistended. 


EXTREMITIES: No cyanosis, or edema. 


MUSCULOSKELETAL: muscle wasting. 


NEUROLOGICAL: .Awake, alert, and oriented x3.


Medications and IVs





Current Medications








 Medications


  (Trade)  Dose


 Ordered  Sig/Melodie


 Route  Start Time


 Stop Time Status Last Admin


 


 Sodium Chloride  1,000 ml @ 


 75 mls/hr  [id number]


 IV  4/11/18 15:00


    4/14/18 20:59


 


 


  (NS Flush)  2 ml  UNSCH  PRN


 IV FLUSH  4/11/18 14:15


     


 


 


  (NS Flush)  2 ml  BID


 IV FLUSH  4/11/18 21:00


    4/16/18 08:26


 


 


  (Tylenol)  650 mg  Q4H  PRN


 PO  4/11/18 14:15


     


 


 


  (Zofran Inj)  4 mg  Q6H  PRN


 IVP  4/11/18 14:15


     


 


 


  (Heparin Inj)  5,000 units  Q12H


 SQ  4/11/18 15:00


    4/16/18 02:37


 


 


  (Narcan Inj)  0.4 mg  UNSCH  PRN


 IV PUSH  4/11/18 14:15


     


 


 


  (Giselle-Colace)  1 tab  BID


 PO  4/11/18 21:00


    4/16/18 08:26


 


 


  (Milk Of


 Magnesia Liq)  30 ml  Q12H  PRN


 PO  4/11/18 14:15


     


 


 


  (Senokot)  17.2 mg  Q12H  PRN


 PO  4/11/18 14:15


     


 


 


  (Dulcolax Supp)  10 mg  DAILY  PRN


 RECTAL  4/11/18 14:15


     


 


 


  (Lactulose Liq)  30 ml  DAILY  PRN


 PO  4/11/18 14:15


     


 











Assessment and Plan


Problem List:  


(1) Esophageal adenocarcinoma


ICD Codes:  C15.9 - Malignant neoplasm of esophagus, unspecified


Status:  Chronic


Plan:  Oncology following, biopsy pending. 





(2) Esophageal obstruction


ICD Codes:  K22.2 - Esophageal obstruction


Status:  Acute


Plan:  Tolerating pureed diet, awaiting to be seen surgery today. 





Discharge Planning


Biopsy pending, scheduled to be seen by surgery today for further treatment 

plan.











Vicky Lamb Apr 16, 2018 09:21

## 2018-04-17 VITALS
HEART RATE: 72 BPM | OXYGEN SATURATION: 98 % | RESPIRATION RATE: 18 BRPM | DIASTOLIC BLOOD PRESSURE: 57 MMHG | SYSTOLIC BLOOD PRESSURE: 124 MMHG | TEMPERATURE: 97.6 F

## 2018-04-17 VITALS
RESPIRATION RATE: 20 BRPM | SYSTOLIC BLOOD PRESSURE: 130 MMHG | DIASTOLIC BLOOD PRESSURE: 60 MMHG | TEMPERATURE: 97.1 F | OXYGEN SATURATION: 99 % | HEART RATE: 66 BPM

## 2018-04-17 VITALS
TEMPERATURE: 97.8 F | RESPIRATION RATE: 20 BRPM | DIASTOLIC BLOOD PRESSURE: 55 MMHG | SYSTOLIC BLOOD PRESSURE: 102 MMHG | HEART RATE: 59 BPM | OXYGEN SATURATION: 95 %

## 2018-04-17 PROCEDURE — 0DB64ZX EXCISION OF STOMACH, PERCUTANEOUS ENDOSCOPIC APPROACH, DIAGNOSTIC: ICD-10-PCS | Performed by: SURGERY

## 2018-04-17 PROCEDURE — 0WJP4ZZ INSPECTION OF GASTROINTESTINAL TRACT, PERCUTANEOUS ENDOSCOPIC APPROACH: ICD-10-PCS | Performed by: SURGERY

## 2018-04-17 PROCEDURE — 0DH64UZ INSERTION OF FEEDING DEVICE INTO STOMACH, PERCUTANEOUS ENDOSCOPIC APPROACH: ICD-10-PCS | Performed by: SURGERY

## 2018-04-17 PROCEDURE — 0WJJ4ZZ INSPECTION OF PELVIC CAVITY, PERCUTANEOUS ENDOSCOPIC APPROACH: ICD-10-PCS | Performed by: SURGERY

## 2018-04-17 RX ADMIN — Medication SCH ML: at 08:03

## 2018-04-17 RX ADMIN — HEPARIN SODIUM SCH UNITS: 10000 INJECTION, SOLUTION INTRAVENOUS; SUBCUTANEOUS at 21:25

## 2018-04-17 RX ADMIN — THIAMINE HYDROCHLORIDE SCH MLS/HR: 100 INJECTION, SOLUTION INTRAMUSCULAR; INTRAVENOUS at 04:20

## 2018-04-17 RX ADMIN — STANDARDIZED SENNA CONCENTRATE AND DOCUSATE SODIUM SCH TAB: 8.6; 5 TABLET, FILM COATED ORAL at 21:25

## 2018-04-17 RX ADMIN — Medication SCH ML: at 21:25

## 2018-04-17 RX ADMIN — STANDARDIZED SENNA CONCENTRATE AND DOCUSATE SODIUM SCH TAB: 8.6; 5 TABLET, FILM COATED ORAL at 08:03

## 2018-04-17 RX ADMIN — THIAMINE HYDROCHLORIDE SCH MLS/HR: 100 INJECTION, SOLUTION INTRAMUSCULAR; INTRAVENOUS at 15:09

## 2018-04-17 RX ADMIN — MORPHINE SULFATE PRN MG: 2 INJECTION, SOLUTION INTRAMUSCULAR; INTRAVENOUS at 21:24

## 2018-04-17 RX ADMIN — MORPHINE SULFATE PRN MG: 2 INJECTION, SOLUTION INTRAMUSCULAR; INTRAVENOUS at 15:42

## 2018-04-17 NOTE — MP
cc:

Mathieu Bagley MD

****

 

 

DATE OF OPERATION:

04/17/2018

 

PREOPERATIVE DIAGNOSES:

1.  History of squamous cell carcinoma of the esophagus, status post 

chemoradiation.

2.  Esophageal stricture with associated Lisseth-Wheeler tear.

3.  Protein and calorie malnutrition and unintended weight loss.

 

POSTOPERATIVE DIAGNOSIS

1.  History of squamous cell carcinoma of the esophagus, status post 

chemoradiation.

2.  Esophageal stricture with associated Lisseth-Wheeler tear.

3.  Protein and calorie malnutrition and unintended weight loss.

4.  Large bulky tumor in the body of the stomach concerning with 

carcinoma.

 

ATTENDING SURGEON:

Mathieu Bagley MD

 

ASSISTANT:

Staff.

 

ANESTHESIA:

General.

 

PROCEDURES PERFORMED:

1.  Staging and diagnostic laparoscopy.

2.  Cytology.

3.  No evidence of carcinomatosis.

4.  Malignant-appearing ascites throughout the abdomen.

5.  Liver with cirrhosis.

 

INDICATION FOR PROCEDURE:

The patient is an 82-year-old male with a history of squamous cell 

carcinoma, status post chemoradiation.  The patient had a complete 

response and did not undergo surgery due to high risk for operative 

intervention.  The patient unfortunately developed increasing weight 

loss and dysphagia.  He was admitted to the hospital, underwent 

workup.  An outpatient CT scan did show some hypermetabolic activity 

in the distal esophagus, concerning for possible recurrence.  Upper 

endoscopy as an inpatient did show a distal esophageal stricture with 

Lisseth-Wheeler tear; therefore, no dilation was done and patient was 

unable to receive a PEG tube at that time.  General surgery was 

consulted and surgical oncology was consulted for further evaluation 

for possible feeding tube placement surgically as well as staging 

laparoscopy for restaging of possible recurrent malignancy.  The 

risks, benefits and alternatives to the procedure were discussed with 

the patient prior to the procedure and the patient agreed to undergo 

the procedure.

 

DESCRIPTION OF PROCEDURE:

After informed consent was obtained, the patient was taken to the 

operating room, placed in supine position, placed under general 

endotracheal anesthesia.  The patient's abdomen was shaved, prepped 

and draped in sterile fashion.  A timeout was performed.  The abdomen 

was entered through a Kilo-type technique just below the umbilicus. 

Local anesthetic was used at this site, as well as all port site, as 

well as the PEG tube site.  We directly entered the abdomen under 

visualization and placed a 10 mm balloon trocar into the Kilo 

periumbilical port site and insufflated the abdomen.  We surveyed the 

abdomen with a 5 mm camera.  There was no evidence of any complication

from our entry.  We then placed two 5 mm ports, both up in the left 

upper quadrant under visualization of the laparoscope.  We were able 

to evaluate the abdomen.  There was some plaque-type change of the 

peritoneum without any definitive mass.  There was some significant 

ascites, appeared possibly malignant versus possible liver origin.  

There was significant cirrhotic-appearing liver.  There is a large 

bulky gastric tumor in the body of the stomach.  We did take a biopsy 

of the stomach tumor using laparoscopic EndoShears with Bovie 

electrocautery to take a small shave sample off of this bulky tumor, 

which appeared to be invading outside of the stomach wall.  This was 

passed off for permanent processing.  Cytology was also taken.  We did

irrigate out the abdomen with sterile saline until all suction was 

clear.  We had excellent hemostasis.  At this point in time, turned 

attention towards the gastrostomy tube placement.  We did place a 

gastrostomy tube in the antrum of the stomach more towards the lesser 

curve to stay away from the gastroepiploic artery.  This was placed 

with a laparoscopic kit and we used one of the port sites in the left 

upper quadrant as the site for the PEG tube.  We placed the 4 T-bars 

without difficulty, pulling up the stomach towards the abdominal wall.

 We dilated, placed the  needle central to the T-bars through the port

site and the abdominal wall and into the stomach in the central 

portion of the T-bars.  I passed the wire and dilated this with the 

kit provided.  A 20-Syriac gastrostomy tube was placed through the 

breakaway introducer assembly using Seldinger technique, placing a 

G-tube into the gastric lumen.  We inflated the balloon with 10 mL of 

sterile water and pulled this up, and this was confirmed to be in the 

stomach.  We irrigated and suctioned out and got gastric contents from

our tube.  Once we were satisfied this was in the stomach, we then 

pulled up and tied up our T-bars and tied this to the G-tube as well. 

Insufflation was discontinued.  Ports were removed under visualization

of the laparoscope and peritoneum was expressed.  We closed the 

fascial site of the 10 port and the 5 port with 0 Vicryl suture.  We 

closed the skin with 4-0 Monocryl and Dermabond.  The patient was 

discontinued from anesthesia and taken to the PACU in stable 

condition.  The patient tolerated the procedure well.  No apparent 

complications.  All counts were correct, and I was present and 

scrubbed for the entire procedure.

 

 

__________________________________

MD INOCENCIO Pathak/CINDY

D: 04/17/2018, 12:29 PM

T: 04/17/2018, 01:00 PM

Visit #: [id number]

Job #: [id number]

## 2018-04-17 NOTE — HHI.PR
Subjective


Remarks


reports no complaints, NPO for feeding tube placement this afternoon.





Objective





Vital Signs








  Date Time  Temp Pulse Resp B/P (MAP) Pulse Ox O2 Delivery O2 Flow Rate FiO2


 


4/17/18 08:08      Room Air  


 


4/16/18 20:00      Room Air  


 


4/16/18 20:00 97.6 67 20 96/53 (67) 97   


 


4/16/18 16:00 97.3 66 20 113/54 (73) 98   


 


4/16/18 12:00 97.4 68 20 107/56 (73) 97   


 


4/16/18 09:39      Room Air  














I/O      


 


 4/16/18 4/16/18 4/16/18 4/17/18 4/17/18 4/17/18





 06:59 14:59 22:59 06:59 14:59 22:59


 


Intake Total 240 ml  480 ml   


 


Balance 240 ml  480 ml   


 


      


 


Intake Oral 240 ml  480 ml   


 


# Voids 2  5   


 


# Bowel Movements 0  1   








Result Diagram:  


4/16/18 1122                                                                   

             4/16/18 1122





Procedures


EGD on 4/13/18


Objective Remarks


Physical Exam


General: thin elderly no apparent distress


SKIN: Warm and dry.


HEAD: Normocephalic.


EYES: No scleral icterus. No injection or drainage. 


NECK: Supple, trachea midline. No JVD or lymphadenopathy.


LYMPHATIC: No adenopathy.


CARDIOVASCULAR: Regular rate and rhythm without murmurs. 


RESPIRATORY: Breath sounds equal bilaterally. No accessory muscle use.


GASTROINTESTINAL: Abdomen soft, non-tender, nondistended. 


EXTREMITIES: No cyanosis, or edema. 


MUSCULOSKELETAL: muscle wasting. 


NEUROLOGICAL: .Awake, alert, and oriented x3.


Medications and IVs





Current Medications








 Medications


  (Trade)  Dose


 Ordered  Sig/Melodie


 Route  Start Time


 Stop Time Status Last Admin


 


 Sodium Chloride  1,000 ml @ 


 75 mls/hr  [id number]


 IV  4/11/18 15:00


    4/14/18 20:59


 


 


  (NS Flush)  2 ml  UNSCH  PRN


 IV FLUSH  4/11/18 14:15


     


 


 


  (NS Flush)  2 ml  BID


 IV FLUSH  4/11/18 21:00


    4/17/18 08:03


 


 


  (Tylenol)  650 mg  Q4H  PRN


 PO  4/11/18 14:15


     


 


 


  (Zofran Inj)  4 mg  Q6H  PRN


 IVP  4/11/18 14:15


     


 


 


  (Narcan Inj)  0.4 mg  UNSCH  PRN


 IV PUSH  4/11/18 14:15


     


 


 


  (Giselle-Colace)  1 tab  BID


 PO  4/11/18 21:00


    4/16/18 08:26


 


 


  (Milk Of


 Magnesia Liq)  30 ml  Q12H  PRN


 PO  4/11/18 14:15


     


 


 


  (Senokot)  17.2 mg  Q12H  PRN


 PO  4/11/18 14:15


     


 


 


  (Dulcolax Supp)  10 mg  DAILY  PRN


 RECTAL  4/11/18 14:15


     


 


 


  (Lactulose Liq)  30 ml  DAILY  PRN


 PO  4/11/18 14:15


     


 


 


  (Heparin Inj)  5,000 units  Q12H


 SQ  4/17/18 20:00


     


 


 


 Lactated Ringer's  1,000 ml @ 


 30 mls/hr  Q24H PRN


 IV  4/17/18 05:15


 4/20/18 05:14   


 


 


 Sodium Chloride  500 ml @ 


 30 mls/hr  [id number] PRN


 IV  4/17/18 05:15


 4/20/18 05:14   


 


 


  (Betadine 5%


 Antisepsis Kit)  1 applic  ON CALL  PRN


 EACH NARE  4/17/18 05:15


 4/20/18 05:14   


 


 


  (Chlorhexidine


 2% Cloth)  3 pack  ON CALL  PRN


 TOPICAL  4/17/18 05:15


 4/20/18 05:14   


 











Assessment and Plan


Problem List:  


(1) Esophageal adenocarcinoma


ICD Codes:  C15.9 - Malignant neoplasm of esophagus, unspecified


Status:  Chronic


Plan:  Oncology following, biopsy pending. 





(2) Esophageal obstruction


ICD Codes:  K22.2 - Esophageal obstruction


Status:  Acute


Plan:  NPO for feeding tube placement





Discharge Planning


Home in am if no complications.











Vicky Lamb Apr 17, 2018 09:16

## 2018-04-18 VITALS
TEMPERATURE: 96.8 F | RESPIRATION RATE: 15 BRPM | DIASTOLIC BLOOD PRESSURE: 57 MMHG | SYSTOLIC BLOOD PRESSURE: 102 MMHG | HEART RATE: 71 BPM | OXYGEN SATURATION: 96 %

## 2018-04-18 VITALS
OXYGEN SATURATION: 97 % | SYSTOLIC BLOOD PRESSURE: 109 MMHG | RESPIRATION RATE: 17 BRPM | TEMPERATURE: 97.9 F | HEART RATE: 66 BPM | DIASTOLIC BLOOD PRESSURE: 56 MMHG

## 2018-04-18 VITALS
DIASTOLIC BLOOD PRESSURE: 63 MMHG | OXYGEN SATURATION: 96 % | HEART RATE: 67 BPM | RESPIRATION RATE: 18 BRPM | SYSTOLIC BLOOD PRESSURE: 122 MMHG | TEMPERATURE: 97.5 F

## 2018-04-18 VITALS
OXYGEN SATURATION: 97 % | TEMPERATURE: 97.1 F | DIASTOLIC BLOOD PRESSURE: 58 MMHG | HEART RATE: 73 BPM | RESPIRATION RATE: 16 BRPM | SYSTOLIC BLOOD PRESSURE: 125 MMHG

## 2018-04-18 VITALS
TEMPERATURE: 97.9 F | SYSTOLIC BLOOD PRESSURE: 115 MMHG | RESPIRATION RATE: 18 BRPM | OXYGEN SATURATION: 96 % | HEART RATE: 71 BPM | DIASTOLIC BLOOD PRESSURE: 58 MMHG

## 2018-04-18 RX ADMIN — HEPARIN SODIUM SCH UNITS: 10000 INJECTION, SOLUTION INTRAVENOUS; SUBCUTANEOUS at 21:48

## 2018-04-18 RX ADMIN — MORPHINE SULFATE PRN MG: 2 INJECTION, SOLUTION INTRAMUSCULAR; INTRAVENOUS at 00:25

## 2018-04-18 RX ADMIN — HEPARIN SODIUM SCH UNITS: 10000 INJECTION, SOLUTION INTRAVENOUS; SUBCUTANEOUS at 10:00

## 2018-04-18 RX ADMIN — THIAMINE HYDROCHLORIDE SCH MLS/HR: 100 INJECTION, SOLUTION INTRAMUSCULAR; INTRAVENOUS at 04:55

## 2018-04-18 RX ADMIN — MORPHINE SULFATE PRN MG: 2 INJECTION, SOLUTION INTRAMUSCULAR; INTRAVENOUS at 10:01

## 2018-04-18 RX ADMIN — MORPHINE SULFATE PRN MG: 2 INJECTION, SOLUTION INTRAMUSCULAR; INTRAVENOUS at 21:49

## 2018-04-18 RX ADMIN — Medication SCH ML: at 09:00

## 2018-04-18 RX ADMIN — MORPHINE SULFATE PRN MG: 2 INJECTION, SOLUTION INTRAMUSCULAR; INTRAVENOUS at 04:07

## 2018-04-18 RX ADMIN — THIAMINE HYDROCHLORIDE SCH MLS/HR: 100 INJECTION, SOLUTION INTRAMUSCULAR; INTRAVENOUS at 20:20

## 2018-04-18 RX ADMIN — Medication SCH ML: at 21:49

## 2018-04-18 RX ADMIN — STANDARDIZED SENNA CONCENTRATE AND DOCUSATE SODIUM SCH TAB: 8.6; 5 TABLET, FILM COATED ORAL at 09:00

## 2018-04-18 RX ADMIN — MORPHINE SULFATE PRN MG: 2 INJECTION, SOLUTION INTRAMUSCULAR; INTRAVENOUS at 06:54

## 2018-04-18 RX ADMIN — MORPHINE SULFATE PRN MG: 2 INJECTION, SOLUTION INTRAMUSCULAR; INTRAVENOUS at 14:33

## 2018-04-18 RX ADMIN — STANDARDIZED SENNA CONCENTRATE AND DOCUSATE SODIUM SCH TAB: 8.6; 5 TABLET, FILM COATED ORAL at 21:49

## 2018-04-18 RX ADMIN — MORPHINE SULFATE PRN MG: 2 INJECTION, SOLUTION INTRAMUSCULAR; INTRAVENOUS at 18:33

## 2018-04-18 NOTE — HHI.FF
Face to Face Verification


Physical Therapy


Instructions:


New peg tube





Home Health Nursing


Instructions:


New peg tube, will need help with care of feeding tube











I have seen patient Edward L Durrwachter on 4/18/18. My clinical findings 

support the need for the requested home health care services because:








I certify that my clinical findings support that this patient is homebound 

because:











Vicky Lamb Apr 18, 2018 07:02

## 2018-04-18 NOTE — HHI.PR
Subjective


Remarks


Had feeding tube placed yesterday complains of pain 8/10.





Objective





Vital Signs








  Date Time  Temp Pulse Resp B/P (MAP) Pulse Ox O2 Delivery O2 Flow Rate FiO2


 


4/18/18 00:00 97.5 67 18 122/63 (82) 96   


 


4/17/18 23:55      Room Air  


 


4/17/18 21:20      Room Air  


 


4/17/18 20:00 97.6 72 18 124/57 (79) 98   


 


4/17/18 16:00 97.1 66 20 130/60 (83) 99   


 


4/17/18 13:45 97.5 75 22 110/53 (72) 95 Nasal Cannula 2 


 


4/17/18 13:30  59 15 116/55 (75) 93   


 


4/17/18 13:15  67 20 122/59 (80) 95   


 


4/17/18 13:00  69 17 130/62 (84) 95   


 


4/17/18 12:45  77 12 147/63 (91) 99   


 


4/17/18 12:36 97.7 83 12 133/63 (86) 100 Simple Mask 6 


 


4/17/18 08:08      Room Air  


 


4/17/18 08:00 97.8 59 20 102/55 (71) 95   














I/O      


 


 4/17/18 4/17/18 4/17/18 4/18/18 4/18/18 4/18/18





 07:00 15:00 23:00 07:00 15:00 23:00


 


Intake Total  800 ml 0 ml 999 ml  


 


Output Total  10 ml  50 ml  


 


Balance  790 ml 0 ml 949 ml  


 


      


 


Intake Oral   0 ml   


 


IV Total    999 ml  


 


Other  800 ml    


 


Drainage Total    50 ml  


 


Estimated Blood Loss  10 ml    


 


# Voids   5 2  


 


# Bowel Movements   0 0  








Result Diagram:  


4/16/18 1122                                                                   

             4/16/18 1122





Procedures


EGD on 4/13/18


Objective Remarks


Physical Exam


General: thin elderly in no distress


SKIN: Warm and dry.


HEAD: Normocephalic.


EYES: No scleral icterus. No injection or drainage. 


NECK: Supple, trachea midline. No JVD or lymphadenopathy.


LYMPHATIC: No adenopathy.


CARDIOVASCULAR: Regular rate and rhythm without murmurs. 


RESPIRATORY: Breath sounds equal bilaterally. No accessory muscle use.


GASTROINTESTINAL: Abdomen soft, nondistended, very tender, peg tube in place


EXTREMITIES: No cyanosis, or edema. 


MUSCULOSKELETAL: muscle wasting. 


NEUROLOGICAL: .Awake, alert, and oriented x3.


Medications and IVs





Current Medications








 Medications


  (Trade)  Dose


 Ordered  Sig/Melodie


 Route  Start Time


 Stop Time Status Last Admin


 


 Sodium Chloride  1,000 ml @ 


 75 mls/hr  [id number]


 IV  4/11/18 15:00


    4/18/18 04:55


 


 


  (NS Flush)  2 ml  UNSCH  PRN


 IV FLUSH  4/11/18 14:15


    4/18/18 00:26


 


 


  (NS Flush)  2 ml  BID


 IV FLUSH  4/11/18 21:00


    4/17/18 21:25


 


 


  (Tylenol)  650 mg  Q4H  PRN


 PO  4/11/18 14:15


     


 


 


  (Zofran Inj)  4 mg  Q6H  PRN


 IVP  4/11/18 14:15


     


 


 


  (Narcan Inj)  0.4 mg  UNSCH  PRN


 IV PUSH  4/11/18 14:15


     


 


 


  (Giselle-Colace)  1 tab  BID


 PO  4/11/18 21:00


    4/17/18 21:25


 


 


  (Milk Of


 Magnesia Liq)  30 ml  Q12H  PRN


 PO  4/11/18 14:15


     


 


 


  (Senokot)  17.2 mg  Q12H  PRN


 PO  4/11/18 14:15


     


 


 


  (Dulcolax Supp)  10 mg  DAILY  PRN


 RECTAL  4/11/18 14:15


     


 


 


  (Lactulose Liq)  30 ml  DAILY  PRN


 PO  4/11/18 14:15


     


 


 


  (Heparin Inj)  5,000 units  Q12H


 SQ  4/17/18 20:00


    4/17/18 21:25


 


 


 Lactated Ringer's  1,000 ml @ 


 30 mls/hr  Q24H PRN


 IV  4/17/18 05:15


 4/20/18 05:14   


 


 


 Sodium Chloride  500 ml @ 


 30 mls/hr  [id number] PRN


 IV  4/17/18 05:15


 4/20/18 05:14   


 


 


  (Betadine 5%


 Antisepsis Kit)  1 applic  ON CALL  PRN


 EACH NARE  4/17/18 05:15


 4/20/18 05:14   


 


 


  (Chlorhexidine


 2% Cloth)  3 pack  ON CALL  PRN


 TOPICAL  4/17/18 05:15


 4/20/18 05:14   


 


 


  (Morphine Inj)  2 mg  Q3H  PRN


 IV PUSH  4/17/18 13:45


    4/18/18 06:54


 


 


  (Toradol Inj)  15 mg  Q6H  PRN


 IV PUSH  4/17/18 12:30


 4/22/18 12:29   


 


 


 Miscellaneous


 Information  ALL


 NURSING


 DEPARTME...  UNSCH  PRN


 .XX  4/17/18 12:40


 4/18/18 12:39   


 











Assessment and Plan


Problem List:  


(1) Esophageal obstruction


ICD Codes:  K22.2 - Esophageal obstruction


Status:  Acute


Plan:  peg tube place, complain of pain 8/10


on IV MS.





(2) History of esophageal cancer


ICD Codes:  Z85.01 - Personal history of malignant neoplasm of esophagus


Status:  Acute


Discharge Planning


Home with [facility] when pain controlled











Vicky Lamb Apr 18, 2018 06:59

## 2018-04-18 NOTE — PD.ONC.PN
Subjective


Subjective


Remarks


complains of mild abd discomfort near G tube.





Objective


Data











  Date Time  Temp Pulse Resp B/P (MAP) Pulse Ox O2 Delivery O2 Flow Rate FiO2


 


4/18/18 00:00 97.5 67 18 122/63 (82) 96   


 


4/17/18 23:55      Room Air  


 


4/17/18 21:20      Room Air  


 


4/17/18 20:00 97.6 72 18 124/57 (79) 98   


 


4/17/18 16:00 97.1 66 20 130/60 (83) 99   


 


4/17/18 13:45 97.5 75 22 110/53 (72) 95 Nasal Cannula 2 


 


4/17/18 13:30  59 15 116/55 (75) 93   


 


4/17/18 13:15  67 20 122/59 (80) 95   


 


4/17/18 13:00  69 17 130/62 (84) 95   


 


4/17/18 12:45  77 12 147/63 (91) 99   


 


4/17/18 12:36 97.7 83 12 133/63 (86) 100 Simple Mask 6 














 4/18/18 4/18/18 4/18/18





 07:00 15:00 23:00


 


Intake Total 999 ml  


 


Output Total 50 ml  


 


Balance 949 ml  








Result Diagram:  


4/16/18 1122                                                                   

             4/16/18 1122








Administered Medications








 Medications


  (Trade)  Dose


 Ordered  Sig/Melodie


 Route


 PRN Reason  Start Time


 Stop Time Status Last Admin


Dose Admin


 


 Sodium Chloride  1,000 ml @ 


 75 mls/hr  [id number]


 IV


   4/11/18 15:00


    4/18/18 04:55


 


 


 Sodium Chloride


  (NS Flush)  2 ml  UNSCH  PRN


 IV FLUSH


 FLUSH AFTER USING IV ACCESS  4/11/18 14:15


    4/18/18 00:26


 


 


 Sodium Chloride


  (NS Flush)  2 ml  BID


 IV FLUSH


   4/11/18 21:00


    4/17/18 21:25


 


 


 Senna/Docusate


 Sodium


  (Giselle-Colace)  1 tab  BID


 PO


   4/11/18 21:00


    4/17/18 21:25


 


 


 Heparin Sodium


  (Porcine)


  (Heparin Inj)  5,000 units  Q12H


 SQ


   4/17/18 20:00


    4/17/18 21:25


 


 


 Morphine Sulfate


  (Morphine Inj)  2 mg  Q3H  PRN


 IV PUSH


 PAIN SCALE 5 TO 10  4/17/18 13:45


    4/18/18 06:54


 








Objective Remarks


GENERAL: gaunt and has G tube. 


SKIN: Warm and dry.


HEAD: Normocephalic.


EYES: No scleral icterus. No injection or drainage. 


NECK: Supple, trachea midline. No JVD or lymphadenopathy.


LYMPHATIC: No adenopathy.


CARDIOVASCULAR: Regular rate and rhythm without murmurs. 


RESPIRATORY: Breath sounds equal bilaterally. No accessory muscle use.


GASTROINTESTINAL: Abdomen soft, mild tenderness near G tube. 


EXTREMITIES: No cyanosis, or edema. 


MUSCULOSKELETAL: muscle wasting


NEUROLOGICAL: No obvious focal deficit. Awake, alert, and oriented x3.


PSYCHIATRIC: Appropriate mood and affect; insight and judgment normal.





Assessment/Plan


Assessment


1: I spoke with Dr. Bagley and there was DEFINITE TUMOR  on outside of 

stomach.   Await path to see if squamous cell ( from esophagus) or 

adenocarcinoma- probable gastric cancer.  I spoke with patient concerning 

options.


option 1-hospice,   option 2- chemotherapy and if excellent response and 

ascites does not reveal cancer then he could, although unlikely, become a 

candidate for surgery.  After discussions with Dr. Bagley, he is not a 

candidate for surgery presently.   It is unlikely he will become a surgical 

candidate in future but one can always hold out hope. 





2: He is painful and will need an increase in MS.  Would not discharge home 

until it is clear that he is tolerating tube feedings and home care is in 

place.  He may deteriorate over next month and if this takes place I will 

consult hospice but he does not want to do this now.   Appreciate all the help.











Isacc Wheeler MD Apr 18, 2018 09:01

## 2018-04-18 NOTE — HHI.FF
Face to Face Verification


Diagnosis:  


(1) Esophageal obstruction


Home Health Nursing








Order: Wound care and dressing changes





 Nursing assessment with vital signs








Instructions:


1/2 can Ensure 4 times daily through surgically placed gastrostomy tube 


Okay for thin clear liquids PO











I have seen patient Edward L Durrwachter on 4/18/18. My clinical findings 

support the need for the requested home health care services because:








 Limited ability to care for self














I certify that my clinical findings support that this patient is homebound 

because:








 Post-op weakness

















Eryn Shah/First Assist ARNP Apr 18, 2018 10:05

## 2018-04-18 NOTE — HHI.PR
__________________________________________________





Subjective


Subjective Notes


Ambulating in room 


Painful





Objective


Vitals/I&O





Vital Signs








  Date Time  Temp Pulse Resp B/P (MAP) Pulse Ox O2 Delivery O2 Flow Rate FiO2


 


4/18/18 08:00 97.9 66 17 109/56 (73) 97   


 


4/17/18 23:55      Room Air  


 


4/17/18 13:45       2 








Cardiovascular:  Regular


Lungs:  Clear


Abdomen:  Other (lap sites c/d/i; G tube ----removed collection bag and clamped 

)


Extremities:  No edema





A/P


Problem List:  


(1) Esophageal obstruction


ICD Codes:  K22.2 - Esophageal obstruction


Status:  Acute


(2) History of esophageal cancer


ICD Codes:  Z85.01 - Personal history of malignant neoplasm of esophagus


Status:  Acute


(3) Esophageal adenocarcinoma


ICD Codes:  C15.9 - Malignant neoplasm of esophagus, unspecified


Status:  Chronic


Assessment and Plan


82 year old male POD1 laparoscopic gastrostomy tube 





-Start bolus tube feeding---1/2 can 4 times daily 


-Okay for thin, clear liquids PO


-Pain controlled---added Hycet 


-OOB and mobilize


-CM consulted--- [facility] face to face done for continued G tube care





Attending Statement


The exam, history, and the medical decision-making described in the above note 

were completed with the assistance of the mid-level provider. I reviewed and 

agree with the findings presented.  I attest that I had a face-to-face 

encounter with the patient on the same day, and personally performed and 

documented my assessment and findings in the medical record.





s/p lap G-tube, dong well





fu path





continue pain control, ok to use tube











Eryn ShahP/First Assist ARNP Apr 18, 2018 10:08


Mathieu Bagley MD Apr 19, 2018 13:37

## 2018-04-19 VITALS
RESPIRATION RATE: 17 BRPM | HEART RATE: 73 BPM | TEMPERATURE: 98.1 F | OXYGEN SATURATION: 94 % | DIASTOLIC BLOOD PRESSURE: 55 MMHG | SYSTOLIC BLOOD PRESSURE: 111 MMHG

## 2018-04-19 VITALS
TEMPERATURE: 98.1 F | RESPIRATION RATE: 20 BRPM | OXYGEN SATURATION: 97 % | DIASTOLIC BLOOD PRESSURE: 59 MMHG | HEART RATE: 71 BPM | SYSTOLIC BLOOD PRESSURE: 121 MMHG

## 2018-04-19 VITALS
TEMPERATURE: 98.1 F | RESPIRATION RATE: 18 BRPM | DIASTOLIC BLOOD PRESSURE: 61 MMHG | HEART RATE: 84 BPM | SYSTOLIC BLOOD PRESSURE: 129 MMHG | OXYGEN SATURATION: 93 %

## 2018-04-19 VITALS
TEMPERATURE: 98.1 F | RESPIRATION RATE: 16 BRPM | DIASTOLIC BLOOD PRESSURE: 57 MMHG | SYSTOLIC BLOOD PRESSURE: 110 MMHG | HEART RATE: 74 BPM | OXYGEN SATURATION: 95 %

## 2018-04-19 VITALS
RESPIRATION RATE: 17 BRPM | OXYGEN SATURATION: 95 % | SYSTOLIC BLOOD PRESSURE: 118 MMHG | HEART RATE: 73 BPM | DIASTOLIC BLOOD PRESSURE: 59 MMHG | TEMPERATURE: 97.8 F

## 2018-04-19 VITALS
RESPIRATION RATE: 20 BRPM | TEMPERATURE: 97.8 F | DIASTOLIC BLOOD PRESSURE: 57 MMHG | OXYGEN SATURATION: 96 % | SYSTOLIC BLOOD PRESSURE: 112 MMHG | HEART RATE: 70 BPM

## 2018-04-19 VITALS
HEART RATE: 87 BPM | DIASTOLIC BLOOD PRESSURE: 57 MMHG | TEMPERATURE: 97.6 F | SYSTOLIC BLOOD PRESSURE: 100 MMHG | RESPIRATION RATE: 16 BRPM | OXYGEN SATURATION: 95 %

## 2018-04-19 VITALS
HEART RATE: 70 BPM | TEMPERATURE: 95.6 F | SYSTOLIC BLOOD PRESSURE: 111 MMHG | OXYGEN SATURATION: 95 % | RESPIRATION RATE: 20 BRPM | DIASTOLIC BLOOD PRESSURE: 59 MMHG

## 2018-04-19 RX ADMIN — HEPARIN SODIUM SCH UNITS: 10000 INJECTION, SOLUTION INTRAVENOUS; SUBCUTANEOUS at 21:23

## 2018-04-19 RX ADMIN — HEPARIN SODIUM SCH UNITS: 10000 INJECTION, SOLUTION INTRAVENOUS; SUBCUTANEOUS at 08:02

## 2018-04-19 RX ADMIN — Medication SCH ML: at 21:00

## 2018-04-19 RX ADMIN — MORPHINE SULFATE PRN MG: 2 INJECTION, SOLUTION INTRAMUSCULAR; INTRAVENOUS at 13:29

## 2018-04-19 RX ADMIN — MORPHINE SULFATE PRN MG: 2 INJECTION, SOLUTION INTRAMUSCULAR; INTRAVENOUS at 18:20

## 2018-04-19 RX ADMIN — STANDARDIZED SENNA CONCENTRATE AND DOCUSATE SODIUM SCH TAB: 8.6; 5 TABLET, FILM COATED ORAL at 08:02

## 2018-04-19 RX ADMIN — THIAMINE HYDROCHLORIDE SCH MLS/HR: 100 INJECTION, SOLUTION INTRAMUSCULAR; INTRAVENOUS at 21:23

## 2018-04-19 RX ADMIN — STANDARDIZED SENNA CONCENTRATE AND DOCUSATE SODIUM SCH TAB: 8.6; 5 TABLET, FILM COATED ORAL at 21:00

## 2018-04-19 RX ADMIN — THIAMINE HYDROCHLORIDE SCH MLS/HR: 100 INJECTION, SOLUTION INTRAMUSCULAR; INTRAVENOUS at 09:40

## 2018-04-19 RX ADMIN — MORPHINE SULFATE PRN MG: 2 INJECTION, SOLUTION INTRAMUSCULAR; INTRAVENOUS at 08:02

## 2018-04-19 RX ADMIN — Medication SCH ML: at 08:02

## 2018-04-19 NOTE — RADRPT
EXAM DATE/TIME:  04/19/2018 22:49 

 

[facility] COMPARISON:     

No previous studies available for comparison.

 

                     

INDICATIONS :     

Please confirm PEG tube placement.

                     

 

MEDICAL HISTORY :            

Carcinoma, prostatic. Carcinoma, esophageal. Gastroesophageal reflux disease.   

 

SURGICAL HISTORY :        

Hernia repair.

 

ENCOUNTER:     

Initial                                        

 

ACUITY:     

1 day      

 

PAIN SCORE:     

10/10

 

LOCATION:       

abdomen.

 

FINDINGS:     

A single frontal view of the abdomen shows a collection of contrast within the left upper quadrant. C
ontrast is also seen within a gastrostomy tube. I do not see distinctive rugal folds involving the co
ntrast within the left upper quadrant. No contrast seen within the duodenum. Gas distended loops of l
arge and small bowel seen throughout the abdomen. Concern for pneumoperitoneum below the right hemidi
aphragm.

 

CONCLUSION:     

1. There is contrast within the left upper quadrant but I cannot definitively identify rugal folds to
 clearly suggest this is within the stomach. I would suggest bilateral decubitus films to better eval
uate.

2. Concern for pneumoperitoneum below the right hemidiaphragm. This can be further assessed with the 
decubitus views.

3. Gas distended loops of large and small bowel.

 

 

 

 Phil Obregon Jr., MD on April 19, 2018 at 23:23           

Board Certified Radiologist.

 This report was verified electronically.

## 2018-04-19 NOTE — HHI.PR
Subjective


Remarks


Still requiring IV MS for pain


Bolus have not been started





Objective





Vital Signs








  Date Time  Temp Pulse Resp B/P (MAP) Pulse Ox O2 Delivery O2 Flow Rate FiO2


 


4/19/18 08:05 98.1 73 17 111/55 (73) 94   


 


4/19/18 04:00 97.8 73 17 118/59 (78) 95   


 


4/19/18 00:00 98.1 84 18 129/61 (83) 93   


 


4/18/18 21:54   16     


 


4/18/18 20:15      Room Air  


 


4/18/18 20:00 96.8 71 15 102/57 (72) 96   


 


4/18/18 16:00      Room Air  


 


4/18/18 16:00 97.9 71 18 115/58 (77) 96   


 


4/18/18 12:00      Room Air  


 


4/18/18 12:00 97.1 73 16 125/58 (80) 97   














I/O      


 


 4/18/18 4/18/18 4/18/18 4/19/18 4/19/18 4/19/18





 07:00 15:00 23:00 07:00 15:00 23:00


 


Intake Total 999 ml  240 ml 450 ml  


 


Output Total 50 ml     


 


Balance 949 ml  240 ml 450 ml  


 


      


 


Intake Oral   240 ml 450 ml  


 


IV Total 999 ml     


 


Drainage Total 50 ml     


 


# Voids 2  4 6  


 


# Bowel Movements 0     








Result Diagram:  


4/16/18 1122                                                                   

             4/16/18 1122





Procedures


EGD on 4/13/18


Objective Remarks


Physical Exam


General: thin elderly in no distress


SKIN: Warm and dry.


HEAD: Normocephalic.


EYES: No scleral icterus. No injection or drainage. 


NECK: Supple, trachea midline. No JVD or lymphadenopathy.


LYMPHATIC: No adenopathy.


CARDIOVASCULAR: Regular rate and rhythm without murmurs. 


RESPIRATORY: Breath sounds equal bilaterally. No accessory muscle use.


GASTROINTESTINAL: Abdomen soft, nondistended, very tender, peg tube in place


EXTREMITIES: No cyanosis, or edema. 


MUSCULOSKELETAL: muscle wasting. 


NEUROLOGICAL: .Awake, alert, and oriented x3.


Medications and IVs





Current Medications








 Medications


  (Trade)  Dose


 Ordered  Sig/Melodie


 Route  Start Time


 Stop Time Status Last Admin


 


 Sodium Chloride  1,000 ml @ 


 75 mls/hr  [id number]


 IV  4/11/18 15:00


    4/18/18 04:55


 


 


  (NS Flush)  2 ml  UNSCH  PRN


 IV FLUSH  4/11/18 14:15


    4/18/18 00:26


 


 


  (NS Flush)  2 ml  BID


 IV FLUSH  4/11/18 21:00


    4/19/18 08:02


 


 


  (Tylenol)  650 mg  Q4H  PRN


 PO  4/11/18 14:15


     


 


 


  (Zofran Inj)  4 mg  Q6H  PRN


 IVP  4/11/18 14:15


     


 


 


  (Narcan Inj)  0.4 mg  UNSCH  PRN


 IV PUSH  4/11/18 14:15


     


 


 


  (Giselle-Colace)  1 tab  BID


 PO  4/11/18 21:00


    4/19/18 08:02


 


 


  (Milk Of


 Magnesia Liq)  30 ml  Q12H  PRN


 PO  4/11/18 14:15


     


 


 


  (Senokot)  17.2 mg  Q12H  PRN


 PO  4/11/18 14:15


     


 


 


  (Dulcolax Supp)  10 mg  DAILY  PRN


 RECTAL  4/11/18 14:15


     


 


 


  (Lactulose Liq)  30 ml  DAILY  PRN


 PO  4/11/18 14:15


     


 


 


  (Heparin Inj)  5,000 units  Q12H


 SQ  4/17/18 20:00


    4/19/18 08:02


 


 


 Lactated Ringer's  1,000 ml @ 


 30 mls/hr  Q24H PRN


 IV  4/17/18 05:15


 4/20/18 05:14   


 


 


 Sodium Chloride  500 ml @ 


 30 mls/hr  [id number] PRN


 IV  4/17/18 05:15


 4/20/18 05:14   


 


 


  (Betadine 5%


 Antisepsis Kit)  1 applic  ON CALL  PRN


 EACH NARE  4/17/18 05:15


 4/20/18 05:14   


 


 


  (Chlorhexidine


 2% Cloth)  3 pack  ON CALL  PRN


 TOPICAL  4/17/18 05:15


 4/20/18 05:14   


 


 


  (Morphine Inj)  4 mg  Q3H  PRN


 IV PUSH  4/18/18 10:45


    4/19/18 08:02


 


 


  (Hycet 325-7.5


 Mg Liq)  15 ml  Q4H  PRN


 PO  4/18/18 10:00


     


 











Assessment and Plan


Problem List:  


(1) Esophageal obstruction


ICD Codes:  K22.2 - Esophageal obstruction


Status:  Acute


Plan:  peg tube place 4/17/18, for Nutritionals support





(2) History of esophageal cancer


ICD Codes:  Z85.01 - Personal history of malignant neoplasm of esophagus


Status:  Acute


Plan:  EGD done 4/17


per oncology notes 4/18  he does have tumor outside of stomach.


Not surgical candidate at this time. Will f/u w/ oncology outpatient





(3) S/P percutaneous endoscopic gastrostomy (PEG) tube placement


ICD Codes:  Z93.1 - Gastrostomy status


Plan:  Will start bolus feed 1/2 can of ensure 4 times day per GI


 





(4) Pain around PEG tube site


ICD Codes:  T85.848A - Pain due to other internal prosthetic devices, implants 

and grafts, initial encounter


Plan:  Requiring IV MS to control pain. 





Assessment and Plan


Start bolus feeding


can DC home w/ C once pain control and tolerating bolus feeds











Vicky Lamb Apr 19, 2018 08:37

## 2018-04-19 NOTE — PD.ONC.PN
Subjective


Subjective


Remarks


Afebrile overnight. 


Patient resting in room in nad.


sometimes has abdominal pain. 


continues to use the IV morphine as needed.





Objective


Data











  Date Time  Temp Pulse Resp B/P (MAP) Pulse Ox O2 Delivery O2 Flow Rate FiO2


 


4/19/18 08:05 98.1 73 17 111/55 (73) 94   


 


4/19/18 08:00      Room Air  


 


4/19/18 08:00 97.8 70 20 112/57 (75) 96   


 


4/19/18 04:00 97.8 73 17 118/59 (78) 95   


 


4/19/18 00:00 98.1 84 18 129/61 (83) 93   


 


4/18/18 21:54   16     


 


4/18/18 20:15      Room Air  


 


4/18/18 20:00 96.8 71 15 102/57 (72) 96   


 


4/18/18 16:00      Room Air  


 


4/18/18 16:00 97.9 71 18 115/58 (77) 96   


 


4/18/18 12:00      Room Air  


 


4/18/18 12:00 97.1 73 16 125/58 (80) 97   














 4/19/18 4/19/18 4/19/18





 07:00 15:00 23:00


 


Intake Total 450 ml  


 


Balance 450 ml  








Result Diagram:  


4/16/18 1122                                                                   

             4/16/18 1122








Administered Medications








 Medications


  (Trade)  Dose


 Ordered  Sig/Melodie


 Route


 PRN Reason  Start Time


 Stop Time Status Last Admin


Dose Admin


 


 Sodium Chloride  1,000 ml @ 


 75 mls/hr  [id number]


 IV


   4/11/18 15:00


    4/18/18 04:55


 


 


 Sodium Chloride


  (NS Flush)  2 ml  UNSCH  PRN


 IV FLUSH


 FLUSH AFTER USING IV ACCESS  4/11/18 14:15


    4/18/18 00:26


 


 


 Sodium Chloride


  (NS Flush)  2 ml  BID


 IV FLUSH


   4/11/18 21:00


    4/19/18 08:02


 


 


 Senna/Docusate


 Sodium


  (Giselle-Colace)  1 tab  BID


 PO


   4/11/18 21:00


    4/19/18 08:02


 


 


 Heparin Sodium


  (Porcine)


  (Heparin Inj)  5,000 units  Q12H


 SQ


   4/17/18 20:00


    4/19/18 08:02


 


 


 Morphine Sulfate


  (Morphine Inj)  4 mg  Q3H  PRN


 IV PUSH


 PAIN SCALE 5 TO 10  4/18/18 10:45


    4/19/18 08:02


 








Objective Remarks


GENERAL: Pleasant elderly male, supine in bed.


SKIN: Warm and dry.


HEAD: Normocephalic.


EYES: No injection or drainage. 


NECK: Supple, trachea midline. 


CARDIOVASCULAR: Regular rate and rhythm


RESPIRATORY: Breath sounds equal bilaterally. No accessory muscle use.


GASTROINTESTINAL: Abdomen soft, mildly tender to palpation throughout. G-tube 

clamped.  


EXTREMITIES: No cyanosis


NEUROLOGICAL: awake and alert. normal speech.





Assessment/Plan


Assessment


83y/o male with h/o esophageal cancer.


Plan


1. await dietician consult--once patient has started and is tolerating tube 

feeds he can be discharged home from oncology perspective


2. continue IV morphine as needed, patient also has PRN hycet. 


3. plan for follow up in clinic upon discharge











Maribell Rivero Apr 19, 2018 11:56


Isacc Wheeler MD Apr 19, 2018 19:43

## 2018-04-20 VITALS
TEMPERATURE: 98.1 F | DIASTOLIC BLOOD PRESSURE: 58 MMHG | SYSTOLIC BLOOD PRESSURE: 101 MMHG | OXYGEN SATURATION: 95 % | RESPIRATION RATE: 16 BRPM | HEART RATE: 72 BPM

## 2018-04-20 VITALS
DIASTOLIC BLOOD PRESSURE: 56 MMHG | RESPIRATION RATE: 16 BRPM | HEART RATE: 82 BPM | SYSTOLIC BLOOD PRESSURE: 94 MMHG | TEMPERATURE: 98.6 F | OXYGEN SATURATION: 93 %

## 2018-04-20 VITALS
RESPIRATION RATE: 16 BRPM | DIASTOLIC BLOOD PRESSURE: 54 MMHG | HEART RATE: 75 BPM | TEMPERATURE: 97.9 F | SYSTOLIC BLOOD PRESSURE: 105 MMHG | OXYGEN SATURATION: 95 %

## 2018-04-20 VITALS
DIASTOLIC BLOOD PRESSURE: 57 MMHG | TEMPERATURE: 98.3 F | RESPIRATION RATE: 18 BRPM | OXYGEN SATURATION: 95 % | HEART RATE: 68 BPM | SYSTOLIC BLOOD PRESSURE: 114 MMHG

## 2018-04-20 VITALS
TEMPERATURE: 98.2 F | DIASTOLIC BLOOD PRESSURE: 58 MMHG | SYSTOLIC BLOOD PRESSURE: 117 MMHG | OXYGEN SATURATION: 96 % | HEART RATE: 71 BPM | RESPIRATION RATE: 18 BRPM

## 2018-04-20 VITALS
HEART RATE: 78 BPM | OXYGEN SATURATION: 96 % | DIASTOLIC BLOOD PRESSURE: 60 MMHG | SYSTOLIC BLOOD PRESSURE: 108 MMHG | TEMPERATURE: 98.3 F | RESPIRATION RATE: 17 BRPM

## 2018-04-20 RX ADMIN — STANDARDIZED SENNA CONCENTRATE AND DOCUSATE SODIUM SCH TAB: 8.6; 5 TABLET, FILM COATED ORAL at 20:27

## 2018-04-20 RX ADMIN — STANDARDIZED SENNA CONCENTRATE AND DOCUSATE SODIUM SCH TAB: 8.6; 5 TABLET, FILM COATED ORAL at 08:31

## 2018-04-20 RX ADMIN — MORPHINE SULFATE PRN MG: 2 INJECTION, SOLUTION INTRAMUSCULAR; INTRAVENOUS at 20:21

## 2018-04-20 RX ADMIN — Medication SCH ML: at 20:27

## 2018-04-20 RX ADMIN — Medication SCH ML: at 07:15

## 2018-04-20 RX ADMIN — HEPARIN SODIUM SCH UNITS: 10000 INJECTION, SOLUTION INTRAVENOUS; SUBCUTANEOUS at 20:25

## 2018-04-20 RX ADMIN — HEPARIN SODIUM SCH UNITS: 10000 INJECTION, SOLUTION INTRAVENOUS; SUBCUTANEOUS at 08:32

## 2018-04-20 RX ADMIN — THIAMINE HYDROCHLORIDE SCH MLS/HR: 100 INJECTION, SOLUTION INTRAMUSCULAR; INTRAVENOUS at 08:32

## 2018-04-20 RX ADMIN — THIAMINE HYDROCHLORIDE SCH MLS/HR: 100 INJECTION, SOLUTION INTRAMUSCULAR; INTRAVENOUS at 23:29

## 2018-04-20 NOTE — HHI.PR
Subjective


Remarks


Voices this am there is a problem with his tube.


Abdomen 





Objective





Vital Signs








  Date Time  Temp Pulse Resp B/P (MAP) Pulse Ox O2 Delivery O2 Flow Rate FiO2


 


4/20/18 08:00 98.1 72 16 101/58 (72) 95   


 


4/20/18 04:00      Room Air  


 


4/20/18 03:32 98.6 82 16 94/56 (69) 93   


 


4/20/18 00:00      Room Air  


 


4/19/18 23:25 98.1 74 16 110/57 (74) 95   


 


4/19/18 21:25      Room Air  


 


4/19/18 19:50 97.6 87 16 100/57 (71) 95   


 


4/19/18 16:00      Room Air  


 


4/19/18 16:00 98.1 71 20 121/59 (79) 97   


 


4/19/18 12:00 95.6 70 20 111/59 (76) 95   


 


4/19/18 12:00      Room Air  














I/O      


 


 4/19/18 4/19/18 4/19/18 4/20/18 4/20/18 4/20/18





 07:00 15:00 23:00 07:00 15:00 23:00


 


Intake Total 450 ml   0 ml  


 


Balance 450 ml   0 ml  


 


      


 


Intake Oral 450 ml   0 ml  


 


# Voids 6 5  5  


 


# Bowel Movements  0  0  








Result Diagram:  


4/16/18 1122                                                                   

             4/16/18 1122





Procedures


EGD on 4/13/18


Other Results





Last 72 hours Impressions








Abdomen X-Ray 4/19/18 0000 Signed





Impressions: 





 Service Date/Time:  Thursday, April 19, 2018 22:49 - CONCLUSION:  1. There is 





 contrast within the left upper quadrant but I cannot definitively identify 

rugal 





 folds to clearly suggest this is within the stomach. I would suggest bilateral 





 decubitus films to better evaluate. 2. Concern for pneumoperitoneum below the 





 right hemidiaphragm. This can be further assessed with the decubitus views. 3. 





 Gas distended loops of large and small bowel.     Phil Obregon Jr., MD 








Objective Remarks


Physical Exam


General: thin elderly in no distress


SKIN: Warm and dry.


HEAD: Normocephalic.


EYES: No scleral icterus. No injection or drainage. 


NECK: Supple, trachea midline. No JVD or lymphadenopathy.


LYMPHATIC: No adenopathy.


CARDIOVASCULAR: Regular rate and rhythm without murmurs. 


RESPIRATORY: Breath sounds equal bilaterally. No accessory muscle use.


GASTROINTESTINAL: Abdomen soft, nondistended, very tender, peg tube in place


EXTREMITIES: No cyanosis, or edema. 


MUSCULOSKELETAL: muscle wasting. 


NEUROLOGICAL: .Awake, alert, and oriented x3.


Medications and IVs





Current Medications








 Medications


  (Trade)  Dose


 Ordered  Sig/Melodie


 Route  Start Time


 Stop Time Status Last Admin


 


 Sodium Chloride  1,000 ml @ 


 75 mls/hr  [id number]


 IV  4/11/18 15:00


    4/18/18 04:55


 


 


  (NS Flush)  2 ml  UNSCH  PRN


 IV FLUSH  4/11/18 14:15


    4/18/18 00:26


 


 


  (NS Flush)  2 ml  BID


 IV FLUSH  4/11/18 21:00


    4/20/18 07:15


 


 


  (Tylenol)  650 mg  Q4H  PRN


 PO  4/11/18 14:15


     


 


 


  (Zofran Inj)  4 mg  Q6H  PRN


 IVP  4/11/18 14:15


     


 


 


  (Narcan Inj)  0.4 mg  UNSCH  PRN


 IV PUSH  4/11/18 14:15


     


 


 


  (Giselle-Colace)  1 tab  BID


 PO  4/11/18 21:00


    4/20/18 08:31


 


 


  (Milk Of


 Magnesia Liq)  30 ml  Q12H  PRN


 PO  4/11/18 14:15


     


 


 


  (Senokot)  17.2 mg  Q12H  PRN


 PO  4/11/18 14:15


     


 


 


  (Dulcolax Supp)  10 mg  DAILY  PRN


 RECTAL  4/11/18 14:15


     


 


 


  (Lactulose Liq)  30 ml  DAILY  PRN


 PO  4/11/18 14:15


     


 


 


  (Heparin Inj)  5,000 units  Q12H


 SQ  4/17/18 20:00


    4/20/18 08:32


 


 


  (Morphine Inj)  4 mg  Q3H  PRN


 IV PUSH  4/18/18 10:45


    4/19/18 18:20


 


 


  (Hycet 325-7.5


 Mg Liq)  15 ml  Q4H  PRN


 PO  4/18/18 10:00


     


 











Assessment and Plan


Problem List:  


(1) Esophageal obstruction


ICD Codes:  K22.2 - Esophageal obstruction


Status:  Acute


Plan:  peg tube place 4/17/18, for Nutritionals support





(2) History of esophageal cancer


ICD Codes:  Z85.01 - Personal history of malignant neoplasm of esophagus


Status:  Acute


Plan:  EGD done 4/17


per oncology notes 4/18  he does have tumor outside of stomach.


Not surgical candidate at this time. Will f/u w/ oncology outpatient





(3) S/P percutaneous endoscopic gastrostomy (PEG) tube placement


ICD Codes:  Z93.1 - Gastrostomy status


Plan:  Will start bolus feed 1/2 can of ensure 4 times day per GI. 


Patient given 1/2 can yesterday. Placement checked for second feeding unable to 

determine  


placement. Xray completed--> concerning for pneumoperitoneum below Right 

Hemidiaphragm.


F/U image w/ decubitus view.  


 





(4) Pain around PEG tube site


ICD Codes:  T85.848A - Pain due to other internal prosthetic devices, implants 

and grafts, initial encounter


Plan:  Pain better. 





Assessment and Plan


Home when cleared by Vicky Cary Apr 20, 2018 09:50

## 2018-04-21 VITALS
OXYGEN SATURATION: 95 % | HEART RATE: 77 BPM | DIASTOLIC BLOOD PRESSURE: 56 MMHG | TEMPERATURE: 98 F | RESPIRATION RATE: 18 BRPM | SYSTOLIC BLOOD PRESSURE: 119 MMHG

## 2018-04-21 VITALS
DIASTOLIC BLOOD PRESSURE: 59 MMHG | SYSTOLIC BLOOD PRESSURE: 115 MMHG | HEART RATE: 75 BPM | TEMPERATURE: 97.5 F | OXYGEN SATURATION: 96 % | RESPIRATION RATE: 16 BRPM

## 2018-04-21 VITALS
RESPIRATION RATE: 20 BRPM | OXYGEN SATURATION: 92 % | HEART RATE: 88 BPM | TEMPERATURE: 97.6 F | DIASTOLIC BLOOD PRESSURE: 78 MMHG | SYSTOLIC BLOOD PRESSURE: 105 MMHG

## 2018-04-21 VITALS
TEMPERATURE: 98 F | RESPIRATION RATE: 19 BRPM | OXYGEN SATURATION: 98 % | DIASTOLIC BLOOD PRESSURE: 54 MMHG | SYSTOLIC BLOOD PRESSURE: 100 MMHG | HEART RATE: 78 BPM

## 2018-04-21 VITALS
TEMPERATURE: 97.7 F | RESPIRATION RATE: 16 BRPM | DIASTOLIC BLOOD PRESSURE: 60 MMHG | SYSTOLIC BLOOD PRESSURE: 113 MMHG | OXYGEN SATURATION: 94 % | HEART RATE: 79 BPM

## 2018-04-21 RX ADMIN — MORPHINE SULFATE PRN MG: 2 INJECTION, SOLUTION INTRAMUSCULAR; INTRAVENOUS at 09:21

## 2018-04-21 RX ADMIN — HEPARIN SODIUM SCH UNITS: 10000 INJECTION, SOLUTION INTRAVENOUS; SUBCUTANEOUS at 21:14

## 2018-04-21 RX ADMIN — STANDARDIZED SENNA CONCENTRATE AND DOCUSATE SODIUM SCH TAB: 8.6; 5 TABLET, FILM COATED ORAL at 21:13

## 2018-04-21 RX ADMIN — STANDARDIZED SENNA CONCENTRATE AND DOCUSATE SODIUM SCH TAB: 8.6; 5 TABLET, FILM COATED ORAL at 09:00

## 2018-04-21 RX ADMIN — HEPARIN SODIUM SCH UNITS: 10000 INJECTION, SOLUTION INTRAVENOUS; SUBCUTANEOUS at 09:18

## 2018-04-21 RX ADMIN — Medication SCH ML: at 09:18

## 2018-04-21 RX ADMIN — MORPHINE SULFATE PRN MG: 2 INJECTION, SOLUTION INTRAMUSCULAR; INTRAVENOUS at 12:32

## 2018-04-21 RX ADMIN — STANDARDIZED SENNA CONCENTRATE AND DOCUSATE SODIUM SCH TAB: 8.6; 5 TABLET, FILM COATED ORAL at 21:00

## 2018-04-21 RX ADMIN — Medication SCH ML: at 21:14

## 2018-04-21 NOTE — RADRPT
EXAM DATE/TIME:  04/21/2018 14:55 

 

[facility] COMPARISON:     

ABDOMEN SINGLE VIEW, April 19, 2018, 22:49.

 

                     

INDICATIONS :     

Confirm G-tube placement.

                     

 

MEDICAL HISTORY :            

Carcinoma, prostatic. Carcinoma, esophageal. Gastroesophageal reflux disease.   

 

SURGICAL HISTORY :        

Hernia repair.

 

ENCOUNTER:     

Subsequent                                        

 

ACUITY:     

2 days      

 

PAIN SCORE:     

0/10

 

LOCATION:     

Bilateral  abdomen.

 

FINDINGS:     

Examination of the abdomen demonstrates a PEG tube in the left upper abdominal quadrant with T-fasten
ers. Contrast is seen in the gastric fundus and in portions of the colon. No contrast outside of the 
bowel. Some air distention of the colon may represent some degree of hypodynamic ileus. As seen previ
ously, there is some air density in the right upper abdominal quadrant. Findings could represent some
 pneumoperitoneum, possibly associated with tube placement.

 

CONCLUSION:     

1. Contrast is all contained within the bowel. As such, PEG tube is confirmed in the gastric lumen.

2. Air distention of some small bowel loops with contrast identified in the colon. Findings suggest a
 mild hypodynamic ileus.

3. Air density in the right upper abdominal quadrant and midline could represent pneumoperitoneum ass
ociated with tube placement. Again, no contrast is identified in the peritoneal cavity to suggest an 
ongoing leak.

 

 

 

 Lorenzo Sousa MD on April 21, 2018 at 16:11           

Board Certified Radiologist.

 This report was verified electronically.

## 2018-04-21 NOTE — HHI.PR
Subjective


Remarks


Patient seen and examined. He has esophageal cancer and problems swallowing 

prompting a G Tube placement. I am told the PEG is not functioning and surgery 

will need to reassess.





Objective





Vital Signs








  Date Time  Temp Pulse Resp B/P (MAP) Pulse Ox O2 Delivery O2 Flow Rate FiO2


 


4/21/18 12:00 97.6 88 20 105/78 (87) 92   


 


4/21/18 08:00 97.5 75 16 115/59 (77) 96   


 


4/21/18 04:00      Room Air  


 


4/21/18 03:32 97.7 79 16 113/60 (77) 94   


 


4/21/18 00:00      Room Air  


 


4/20/18 23:20 97.9 75 16 105/54 (71) 95   


 


4/20/18 22:06   17     


 


4/20/18 20:25      Room Air  


 


4/20/18 19:42 98.3 78 17 108/60 (76) 96   


 


4/20/18 16:00 98.3 68 18 114/57 (76) 95   














I/O      


 


 4/20/18 4/20/18 4/20/18 4/21/18 4/21/18 4/21/18





 07:00 15:00 23:00 07:00 15:00 23:00


 


Intake Total 0 ml  120 ml 120 ml  


 


Balance 0 ml  120 ml 120 ml  


 


      


 


Intake Oral 0 ml  120 ml 120 ml  


 


# Voids 5  6 3  


 


# Bowel Movements 0  3 1  








Imaging





Last Impressions








Abdomen X-Ray 4/19/18 0000 Signed





Impressions: 





 Service Date/Time:  Thursday, April 19, 2018 22:49 - CONCLUSION:  1. There is 





 contrast within the left upper quadrant but I cannot definitively identify 

rugal 





 folds to clearly suggest this is within the stomach. I would suggest bilateral 





 decubitus films to better evaluate. 2. Concern for pneumoperitoneum below the 





 right hemidiaphragm. This can be further assessed with the decubitus views. 3. 





 Gas distended loops of large and small bowel.     Phil Obregon Jr., MD 


 


Chest X-Ray 4/11/18 0825 Signed





Impressions: 





 Service Date/Time:  Wednesday, April 11, 2018 08:35 - CONCLUSION:  New minimal 





 parenchymal changes right apex New from comparison study.     Diego Prince MD  FACR








Procedures


EGD on 4/13/18


Objective Remarks


HEENT - Afebrile with AT/NC head


Lungs - CTA


CV - RRR without rub or gallop


Abd - Soft and tender around PEG but no erythema or D/C is noted. Active BS 

present.


Neuro - alert and oriented


Medications and IVs





Current Medications








 Medications


  (Trade)  Dose


 Ordered  Sig/Melodie


 Route  Start Time


 Stop Time Status Last Admin


 


 Sodium Chloride  1,000 ml @ 


 75 mls/hr  [id number]


 IV  4/11/18 15:00


    4/18/18 04:55


 


 


  (NS Flush)  2 ml  UNSCH  PRN


 IV FLUSH  4/11/18 14:15


    4/18/18 00:26


 


 


  (NS Flush)  2 ml  BID


 IV FLUSH  4/11/18 21:00


    4/21/18 09:18


 


 


  (Tylenol)  650 mg  Q4H  PRN


 PO  4/11/18 14:15


     


 


 


  (Zofran Inj)  4 mg  Q6H  PRN


 IVP  4/11/18 14:15


     


 


 


  (Narcan Inj)  0.4 mg  UNSCH  PRN


 IV PUSH  4/11/18 14:15


     


 


 


  (Giselle-Colace)  1 tab  BID


 PO  4/11/18 21:00


    4/20/18 08:31


 


 


  (Milk Of


 Magnesia Liq)  30 ml  Q12H  PRN


 PO  4/11/18 14:15


     


 


 


  (Senokot)  17.2 mg  Q12H  PRN


 PO  4/11/18 14:15


     


 


 


  (Dulcolax Supp)  10 mg  DAILY  PRN


 RECTAL  4/11/18 14:15


     


 


 


  (Lactulose Liq)  30 ml  DAILY  PRN


 PO  4/11/18 14:15


     


 


 


  (Heparin Inj)  5,000 units  Q12H


 SQ  4/17/18 20:00


    4/21/18 09:18


 


 


  (Morphine Inj)  4 mg  Q3H  PRN


 IV PUSH  4/18/18 10:45


    4/21/18 12:32


 


 


  (Hycet 325-7.5


 Mg Liq)  15 ml  Q4H  PRN


 PO  4/18/18 10:00


     


 











Assessment and Plan


Problem List:  


(1) Esophageal adenocarcinoma


ICD Codes:  C15.9 - Malignant neoplasm of esophagus, unspecified


Status:  Chronic


Plan:  Had PEG placed for nurtrition and is requesting D/C home but I am told 

the PEG is not working. Will ask surgery to reassess.





(2) Esophageal obstruction


ICD Codes:  K22.2 - Esophageal obstruction


Status:  Acute


Plan:  Only tolerating liquids. Now with PEG but I am told it is not working.





Assessment and Plan


Esophageal cancer now with swallowing problems. Bx done and surgery placed a 

PEG but I am told it is not working. Will ask surgery reassess and D/C when 

cleared.


Discussed Condition With


Patient and spouse


Discharge Planning


Home when PEG works











Juarez Mccoy Apr 21, 2018 14:45

## 2018-04-22 VITALS
SYSTOLIC BLOOD PRESSURE: 92 MMHG | OXYGEN SATURATION: 97 % | RESPIRATION RATE: 17 BRPM | DIASTOLIC BLOOD PRESSURE: 51 MMHG | HEART RATE: 76 BPM | TEMPERATURE: 97.6 F

## 2018-04-22 VITALS
OXYGEN SATURATION: 96 % | DIASTOLIC BLOOD PRESSURE: 59 MMHG | SYSTOLIC BLOOD PRESSURE: 103 MMHG | HEART RATE: 67 BPM | RESPIRATION RATE: 16 BRPM | TEMPERATURE: 97.7 F

## 2018-04-22 VITALS
OXYGEN SATURATION: 98 % | RESPIRATION RATE: 16 BRPM | DIASTOLIC BLOOD PRESSURE: 58 MMHG | TEMPERATURE: 98 F | SYSTOLIC BLOOD PRESSURE: 101 MMHG | HEART RATE: 79 BPM

## 2018-04-22 VITALS
TEMPERATURE: 98.2 F | DIASTOLIC BLOOD PRESSURE: 55 MMHG | HEART RATE: 71 BPM | OXYGEN SATURATION: 98 % | SYSTOLIC BLOOD PRESSURE: 96 MMHG | RESPIRATION RATE: 17 BRPM

## 2018-04-22 VITALS — SYSTOLIC BLOOD PRESSURE: 92 MMHG | DIASTOLIC BLOOD PRESSURE: 51 MMHG

## 2018-04-22 RX ADMIN — HEPARIN SODIUM SCH UNITS: 10000 INJECTION, SOLUTION INTRAVENOUS; SUBCUTANEOUS at 09:20

## 2018-04-22 RX ADMIN — THIAMINE HYDROCHLORIDE SCH MLS/HR: 100 INJECTION, SOLUTION INTRAMUSCULAR; INTRAVENOUS at 04:20

## 2018-04-22 RX ADMIN — Medication SCH ML: at 09:20

## 2018-04-22 RX ADMIN — STANDARDIZED SENNA CONCENTRATE AND DOCUSATE SODIUM SCH TAB: 8.6; 5 TABLET, FILM COATED ORAL at 09:00

## 2018-04-22 NOTE — HHI.DS
Discharge Summary


Admission Date


Apr 11, 2018 at 14:11


Discharge Date:  Apr 22, 2018


Admitting Diagnosis





Esophageal obstruction.  History of esophageal cancer.





(1) Esophageal obstruction


ICD Codes:  K22.2 - Esophageal obstruction


Status:  Acute


(2) Esophageal adenocarcinoma


ICD Codes:  C15.9 - Malignant neoplasm of esophagus, unspecified


Status:  Chronic


(3) S/P percutaneous endoscopic gastrostomy (PEG) tube placement


ICD Codes:  Z93.1 - Gastrostomy status


Procedures


EGD on 4/13/18


Brief History


He presented with weight loss and problems swallowing. He was seen and followed 

by GI and Surgery for adenocarcinoma of the esophagus.


Significant Findings


Bx revealed adenocarcinoma of the distal esophagus with extension outside 

esophagus.


Imaging





Last Impressions








Abdomen X-Ray 4/21/18 0000 Signed





Impressions: 





 Service Date/Time:  Saturday, April 21, 2018 14:55 - CONCLUSION:  1. Contrast 

is 





 all contained within the bowel. As such, PEG tube is confirmed in the gastric 





 lumen. 2. Air distention of some small bowel loops with contrast identified in 





 the colon. Findings suggest a mild hypodynamic ileus. 3. Air density in the 





 right upper abdominal quadrant and midline could represent pneumoperitoneum 





 associated with tube placement. Again, no contrast is identified in the 





 peritoneal cavity to suggest an ongoing leak.     Lorenzo Sousa MD 


 


Chest X-Ray 4/11/18 0825 Signed





Impressions: 





 Service Date/Time:  Wednesday, April 11, 2018 08:35 - CONCLUSION:  New minimal 





 parenchymal changes right apex New from comparison study.     Diego Prince MD  FACR








PE at Discharge


HEENT - Afebrile with AT/NC head


Lungs - CTA


CV - RRR without rub or gallop


Abd - Soft and tender around PEG but no erythema or D/C is noted. Active BS 

present.


Neuro - alert and oriented


Hospital Course


After presenting with weight loss he was seen by GI and EGD revealed esophageal 

cancer with extension and Surgery was consulted for PEG. After initially not 

functioning per RN, it is now working and placement confirmed radiographically. 

He will be D/C home today and will F/U with Onc and GI as they have requested. 

He will remain on bolus TF and thin liquids. He will go home with liquid 

Oxycodone for pain. He denies any need for HHS. We will F/U in our office next 

week.


Pt Condition on Discharge:  Fair


Discharge Disposition:  Discharge Home


Discharge Instructions


DIET: Follow Instructions for:  As Tolerated, No Restrictions, Full Liquid Diet

, On Tube Feeding


Activities you can perform:  Regular-No Restrictions


Medication Profile:  No Active Prescriptions or Reported Meds











Juarez Mccoy Apr 22, 2018 10:19

## 2018-04-27 ENCOUNTER — HOSPITAL ENCOUNTER (OUTPATIENT)
Dept: HOSPITAL 17 - HSDC | Age: 83
End: 2018-04-27
Attending: INTERNAL MEDICINE
Payer: MEDICARE

## 2018-04-27 VITALS
OXYGEN SATURATION: 100 % | SYSTOLIC BLOOD PRESSURE: 104 MMHG | TEMPERATURE: 97.7 F | HEART RATE: 69 BPM | DIASTOLIC BLOOD PRESSURE: 56 MMHG | RESPIRATION RATE: 18 BRPM

## 2018-04-27 VITALS — WEIGHT: 128.97 LBS | BODY MASS INDEX: 18.06 KG/M2 | HEIGHT: 71 IN

## 2018-04-27 DIAGNOSIS — K31.9: Primary | ICD-10-CM

## 2018-04-27 DIAGNOSIS — R13.10: ICD-10-CM

## 2018-04-27 DIAGNOSIS — Z85.01: ICD-10-CM

## 2018-04-27 PROCEDURE — 43248 EGD GUIDE WIRE INSERTION: CPT

## 2018-04-27 PROCEDURE — 00731 ANES UPR GI NDSC PX NOS: CPT

## 2018-04-27 PROCEDURE — 43259 EGD US EXAM DUODENUM/JEJUNUM: CPT

## 2018-04-27 PROCEDURE — C1769 GUIDE WIRE: HCPCS

## 2018-04-27 NOTE — PD.PROCEDR
GI Procedure





PROCEDURE PERFORMED


EGD with savory dilation and endoscopic ultrasound





INDICATION FOR PROCEDURE


History of esophageal cancer, dysphagia, recurrence of esophageal cancer





PROCEDURE:


The procedure, risks and benefits were discussed with Patient/POA and informed


consent was obtained.  Anesthesia sedated Patient with Diprivan.  Patient was 

placed in the left lateral decubitus position.








EGD:


The Pentax videoscope was introduced through the oropharynx and advanced to the 

second portion of the duodenum under direct visualization. Retroflexion was 

performed in the stomach.





FINDINGS:


The esophagus there was a mild mid esophageal stricture benign-appearing 

probably the site of his old esophageal cancer mucosa appeared to be smooth 

there was also a severe distal esophageal stricture with some mild friability 

of the mucosa this was dilated using a savory dilator over the guidewire size 

15 postdilatation view revealed a moderate size rent and it appeared quite 

friable very suggestive of a malignancy


The stomach there was a big friable mass in the cardia contiguous with what was 

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 

squamous cell carcinoma the rest of the stomach was otherwise unremarkable


The duodenum this appeared to be unremarkable and within normal limits





EUS:


The Pentax videoscope was introduced through the oropharynx and advanced to the 

second portion of the duodenum .





FINDINGS:


There was a large irregular hypoechoic inhomogeneous mass noted to be at the 

distal end of the esophagus and into the cardia this measured at least 3.5 x 

3.5 cm this would represent a T4b lesion


No regional lymphadenopathy was seen





ESTIMATED BLOOD LOSS:


Minimal





SPECIMENS REMOVED:


None





COMPLICATIONS:


None





IMPRESSION:


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 

MX





PLAN:


Further plans as per oncology


N.p.o. for 6 hours then clear liquid diet for today and advance tomorrow











Kenroy Johnson MD Apr 27, 2018 10:25